# Patient Record
Sex: MALE | Race: WHITE | Employment: OTHER | URBAN - METROPOLITAN AREA
[De-identification: names, ages, dates, MRNs, and addresses within clinical notes are randomized per-mention and may not be internally consistent; named-entity substitution may affect disease eponyms.]

---

## 2019-03-07 PROBLEM — I10 ESSENTIAL HYPERTENSION: Status: ACTIVE | Noted: 2019-03-07

## 2020-03-11 PROBLEM — Z11.59 ENCOUNTER FOR HEPATITIS C SCREENING TEST FOR LOW RISK PATIENT: Status: ACTIVE | Noted: 2020-03-11

## 2020-03-11 PROBLEM — N40.0 BPH WITHOUT OBSTRUCTION/LOWER URINARY TRACT SYMPTOMS: Status: ACTIVE | Noted: 2020-03-11

## 2020-03-11 PROBLEM — E66.09 CLASS 1 OBESITY DUE TO EXCESS CALORIES WITH SERIOUS COMORBIDITY AND BODY MASS INDEX (BMI) OF 31.0 TO 31.9 IN ADULT: Status: ACTIVE | Noted: 2020-03-11

## 2020-03-11 PROBLEM — Z12.11 COLON CANCER SCREENING: Status: ACTIVE | Noted: 2020-03-11

## 2020-04-10 PROBLEM — Z12.11 COLON CANCER SCREENING: Status: RESOLVED | Noted: 2020-03-11 | Resolved: 2020-04-10

## 2020-09-18 PROBLEM — E78.00 HYPERCHOLESTEROLEMIA: Status: ACTIVE | Noted: 2020-09-18

## 2021-04-07 ENCOUNTER — CLINICAL SUPPORT (OUTPATIENT)
Dept: FAMILY MEDICINE CLINIC | Age: 66
End: 2021-04-07
Payer: COMMERCIAL

## 2021-04-07 VITALS — TEMPERATURE: 98.6 F

## 2021-04-07 DIAGNOSIS — Z23 ENCOUNTER FOR IMMUNIZATION: Primary | ICD-10-CM

## 2021-04-07 PROCEDURE — 90471 IMMUNIZATION ADMIN: CPT | Performed by: FAMILY MEDICINE

## 2021-04-07 PROCEDURE — 90715 TDAP VACCINE 7 YRS/> IM: CPT

## 2021-04-07 NOTE — PATIENT INSTRUCTIONS
Diphtheria/Acellular Pertussis/Tetanus Booster Vaccine (Tdap) (By injection) Pertussis Vaccine, Acellular (per-TUS-iss VAX-een, m-JIRC-nxb-lar), Reduced Diphtheria Toxoid (ree-DOOST dif-THEER-ee-a TOX-oyd), Tetanus Toxoid (TET-a-nus TOX-oyd) Protects against infections caused by tetanus (lockjaw), diphtheria, or pertussis (whooping cough). This is a booster vaccine. Brand Name(s): Adacel, Boostrix There may be other brand names for this medicine. When This Medicine Should Not Be Used: You should not receive this vaccine if you have had an allergic reaction to the separate or combined tetanus, diphtheria, or pertussis vaccine. You should not receive this vaccine if you have had seizures, mental changes, or any other serious reaction within 7 days after you received a pertussis vaccine. How to Use This Medicine:  
Injectable · A nurse or other health provider will give you this medicine. · Your doctor will prescribe your exact dose and tell you how often it should be given. This medicine is given as a shot into one of your muscles. · You may receive other vaccines at the same time as this one, but in a different body area. You should receive patient instructions for all of the vaccines. Talk to your doctor or nurse if you have questions. · Read and follow the patient instructions that come with this medicine. Talk to your doctor or pharmacist if you have any questions. Drugs and Foods to Avoid: Ask your doctor or pharmacist before using any other medicine, including over-the-counter medicines, vitamins, and herbal products. · Make sure the doctor knows if you are receiving a treatment or medicine that weakens your immune system. This includes radiation treatment, steroid medicines (such as dexamethasone, hydrocortisone, methylprednisolone, prednisolone, prednisone, Medrol®), or cancer medicines. Warnings While Using This Medicine: · Make sure your doctor knows if you are pregnant or breastfeeding or have epilepsy, a weak immune system, or a history of a stroke. Tell your doctor if you are sick or have a fever. · Tell your doctor about any reaction you had after you received a vaccine. This includes fainting, seizures, a fever over 105 degrees F, or severe redness or swelling where the shot was given. Tell your doctor if you have a history of Guillain-Barré syndrome after you received a vaccine with tetanus. · Call your doctor right away if you faint or have vision changes, numbness or tingling in your arms, hands, or feet, or a seizure after you receive this vaccine. · Tell your doctor if you have an allergy to latex. The syringes may contain dry natural latex rubber. · This vaccine will not treat an active infection. If you have a diphtheria, tetanus, or pertussis infection, you will need medicine to treat the infection. Possible Side Effects While Using This Medicine:  
Call your doctor right away if you notice any of these side effects: · Allergic reaction: Itching or hives, swelling in your face or hands, swelling or tingling in your mouth or throat, chest tightness, trouble breathing · Changes in vision · Fever over 105 degrees F 
· Lightheadedness or fainting · Numbness, tingling, or burning pain in your hands, arms, legs, or feet · Seizures · Sudden numbness or weakness in your arms or legs · Severe pain, redness, or swelling where the shot was given If you notice these less serious side effects, talk with your doctor:  
· Headache · Mild pain, redness, or swelling where the shot was given · Nausea, vomiting, diarrhea, or stomach pain · Tiredness If you notice other side effects that you think are caused by this medicine, tell your doctor. Call your doctor for medical advice about side effects. You may report side effects to FDA at 0-270-AHA-9821 © 2017 [facility] Information is for End User's use only and may not be sold, redistributed or otherwise used for commercial purposes. The above information is an  only. It is not intended as medical advice for individual conditions or treatments. Talk to your doctor, nurse or pharmacist before following any medical regimen to see if it is safe and effective for you.

## 2021-04-07 NOTE — PROGRESS NOTES
Tdap administered in left deltoid. Patient tolerated medication well. AVS provided to patient with medication information. Patient states understanding.

## 2021-06-17 ENCOUNTER — OFFICE VISIT (OUTPATIENT)
Dept: FAMILY MEDICINE CLINIC | Age: 66
End: 2021-06-17
Payer: COMMERCIAL

## 2021-06-17 VITALS
TEMPERATURE: 97.8 F | DIASTOLIC BLOOD PRESSURE: 82 MMHG | RESPIRATION RATE: 16 BRPM | SYSTOLIC BLOOD PRESSURE: 170 MMHG | HEART RATE: 69 BPM | HEIGHT: 69 IN | OXYGEN SATURATION: 96 % | WEIGHT: 210.4 LBS | BODY MASS INDEX: 31.16 KG/M2

## 2021-06-17 DIAGNOSIS — I10 ESSENTIAL HYPERTENSION: Primary | ICD-10-CM

## 2021-06-17 DIAGNOSIS — K57.90 DIVERTICULOSIS: ICD-10-CM

## 2021-06-17 DIAGNOSIS — E66.09 CLASS 1 OBESITY DUE TO EXCESS CALORIES WITH SERIOUS COMORBIDITY AND BODY MASS INDEX (BMI) OF 31.0 TO 31.9 IN ADULT: ICD-10-CM

## 2021-06-17 DIAGNOSIS — E78.00 HYPERCHOLESTEROLEMIA: ICD-10-CM

## 2021-06-17 DIAGNOSIS — K62.5 RECTAL BLEEDING: ICD-10-CM

## 2021-06-17 PROCEDURE — 99214 OFFICE O/P EST MOD 30 MIN: CPT | Performed by: FAMILY MEDICINE

## 2021-06-17 RX ORDER — LOSARTAN POTASSIUM 100 MG/1
TABLET ORAL
Qty: 30 TABLET | Refills: 5 | Status: SHIPPED | OUTPATIENT
Start: 2021-06-17 | End: 2022-02-21

## 2021-06-17 NOTE — ACP (ADVANCE CARE PLANNING)
Non-Provider Advance Care Planning (ACP) Note    Date of ACP Conversation: 6/17/2021  Persons included in Conversation: patient  Length of ACP Conversation in minutes: <16 minutes (Non-Billable)    Conversation requested by:   Provider    Authorized Decision Maker (if patient is incapable of making informed decisions): This person is:  Named in Advance Directive or Healthcare Power of       Primary Decision Maker: Justin Danielanadir [facility] - [phone]    General ACP for ALL Patients with Decision Making Capacity:    Advance Directive Conversation with Patients who have not yet planned:  N/A    Review of Existing Advance Directive: (Select questions covered)  Does this advance directive still reflect your preferences?   Yes    Interventions Provided:  N/A

## 2021-06-17 NOTE — PROGRESS NOTES
Iris Loya is a 61 y.o. male who presents with the following:  Chief Complaint   Patient presents with    Hypertension     6 mo follow up       Patient's hypertension has been doing well on his current medications without any chest pain shortness of breath nor edema however the patient had about 4 were stiff bourbons last night and he believes that that is probably what is caused his blood pressure to be up this morning. Additionally, the patient has been having couple episodes of rectal bleeding and has a history of having had surgery for diverticulitis and a stricture in his sigmoid colon. Patient does have BPH his last PSA was 1.7 and he would like to hold off for another year on doing this. No Known Allergies    Current Outpatient Medications   Medication Sig    losartan (COZAAR) 100 mg tablet TAKE 1 TABLET BY MOUTH EVERY DAY    aspirin (ASPIRIN) 325 mg tablet Take 325 mg by mouth daily. No current facility-administered medications for this visit. Past Medical History:   Diagnosis Date    Hypertension        Past Surgical History:   Procedure Laterality Date    [de-identified]      [place] ABDOMEN SURGERY PROC UNLISTED  2009    part of intestines removed       Family History   Problem Relation Age of Onset    Cancer Father        Social History     Socioeconomic History    Marital status:      Spouse name: Not on file    Number of children: Not on file    Years of education: Not on file    Highest education level: Not on file   Tobacco Use    Smoking status: Never Smoker    Smokeless tobacco: Never Used   Substance and Sexual Activity    Alcohol use:  Yes     Alcohol/week: 20.0 standard drinks     Types: 20 Cans of beer per week     Social Determinants of Health     Financial Resource Strain:     Difficulty of Paying Living Expenses:    Food Insecurity:     Worried About Running Out of Food in the Last Year:     [address] in the Last Year:    Transportation Needs:  Lack of Transportation (Medical):  Lack of Transportation (Non-Medical):    Physical Activity:     Days of Exercise per Week:     Minutes of Exercise per Session:    Stress:     Feeling of Stress :    Social Connections:     Frequency of Communication with Friends and Family:     Frequency of Social Gatherings with Friends and Family:     Attends Orthodox Services:     Active Member of Clubs or Organizations:     Attends Club or Organization Meetings:     Marital Status:        Review of Systems   Constitutional: Negative for chills, fever, malaise/fatigue and weight loss. HENT: Negative for congestion, hearing loss, sore throat and tinnitus. Eyes: Negative for blurred vision, pain and discharge. Respiratory: Negative for cough, shortness of breath and wheezing. Cardiovascular: Negative for chest pain, palpitations, orthopnea, claudication and leg swelling. Gastrointestinal: Positive for blood in stool. Negative for abdominal pain, constipation and heartburn. Genitourinary: Negative for dysuria, frequency and urgency. Musculoskeletal: Negative for falls, joint pain and myalgias. Skin: Negative for itching and rash. Neurological: Negative for dizziness, tingling, tremors and headaches. Endo/Heme/Allergies: Negative for environmental allergies and polydipsia. Psychiatric/Behavioral: Negative for depression and substance abuse. The patient is not nervous/anxious. Visit Vitals  BP (!) 170/82 (BP 1 Location: Left upper arm, BP Patient Position: Sitting, BP Cuff Size: Large adult)   Pulse 69   Temp 97.8 °F (36.6 °C) (Oral)   Resp 16   Ht 5' 9\" (1.753 m)   Wt 210 lb 6.4 oz (95.4 kg)   SpO2 96%   BMI 31.07 kg/m²     Physical Exam  Vitals reviewed. Constitutional:       General: He is not in acute distress. Appearance: Normal appearance. He is not ill-appearing. HENT:      Head: Normocephalic and atraumatic.       Right Ear: Tympanic membrane, ear canal and external ear normal.      Left Ear: Tympanic membrane, ear canal and external ear normal.      Nose: Nose normal. No congestion or rhinorrhea. Mouth/Throat:      Mouth: Mucous membranes are moist.      Pharynx: No oropharyngeal exudate or posterior oropharyngeal erythema. Eyes:      Extraocular Movements: Extraocular movements intact. Conjunctiva/sclera: Conjunctivae normal.      Pupils: Pupils are equal, round, and reactive to light. Comments: Pupil iris and anterior chamber normal.   Neck:      Trachea: No tracheal deviation. Cardiovascular:      Rate and Rhythm: Normal rate and regular rhythm. Pulses: Normal pulses. Heart sounds: Normal heart sounds. No murmur heard. No friction rub. No gallop. Pulmonary:      Effort: Pulmonary effort is normal. No respiratory distress. Breath sounds: Normal breath sounds. No wheezing, rhonchi or rales. Chest:      Chest wall: No tenderness. Abdominal:      General: Bowel sounds are normal. There is no distension. Palpations: Abdomen is soft. There is no mass. Tenderness: There is no abdominal tenderness. There is no guarding or rebound. Hernia: No hernia is present. Musculoskeletal:         General: No tenderness. Normal range of motion. Cervical back: Normal range of motion and neck supple. Right lower leg: No edema. Left lower leg: No edema. Lymphadenopathy:      Cervical: No cervical adenopathy. Skin:     General: Skin is warm and dry. Findings: No erythema or rash. Neurological:      General: No focal deficit present. Mental Status: He is alert and oriented to person, place, and time. Cranial Nerves: No cranial nerve deficit. Motor: No abnormal muscle tone. Deep Tendon Reflexes: Reflexes are normal and symmetric. Reflexes normal.      Comments: Cranial nerves II through XII intact sensory and motor.   Deep tendon reflexes in the biceps triceps knee and ankle are normal and bilaterally symmetrical.   Psychiatric:         Mood and Affect: Mood normal.         Behavior: Behavior normal.         Thought Content: Thought content normal.         Judgment: Judgment normal.           ICD-10-CM ICD-9-CM    1. Essential hypertension  I10 401.9 losartan (COZAAR) 100 mg tablet      METABOLIC PANEL, COMPREHENSIVE   2. Hypercholesterolemia  E78.00 272.0 LIPID PANEL   3. Class 1 obesity due to excess calories with serious comorbidity and body mass index (BMI) of 31.0 to 31.9 in adult  E66.09 278.00     Z68.31 V85.31    4.  Rectal bleeding  K62.5 569.3 CBC WITH AUTOMATED DIFF      FERRITIN      REFERRAL TO SURGERY   5. Diverticulosis  K57.90 562.10 REFERRAL TO SURGERY       Orders Placed This Encounter    METABOLIC PANEL, COMPREHENSIVE     Standing Status:   Future     Standing Expiration Date:   6/17/2022    LIPID PANEL     Standing Status:   Future     Standing Expiration Date:   6/17/2022    CBC WITH AUTOMATED DIFF     Standing Status:   Future     Standing Expiration Date:   6/17/2022    FERRITIN     Standing Status:   Future     Standing Expiration Date:   6/17/2022    REFERRAL TO SURGERY     Referral Priority:   Routine     Referral Type:   Consultation     Referral Reason:   Specialty Services Required     Referred to Provider:   Allyson Guevara MD     Number of Visits Requested:   1    losartan (COZAAR) 100 mg tablet     Sig: TAKE 1 TABLET BY MOUTH EVERY DAY     Dispense:  30 Tablet     Refill:  5           Edgard Wilson MD

## 2021-06-19 LAB
ALBUMIN SERPL-MCNC: 4.3 G/DL (ref 3.8–4.8)
ALBUMIN/GLOB SERPL: 1.9 {RATIO} (ref 1.2–2.2)
ALP SERPL-CCNC: 42 IU/L (ref 48–121)
ALT SERPL-CCNC: 21 IU/L (ref 0–44)
AST SERPL-CCNC: 22 IU/L (ref 0–40)
BASOPHILS # BLD AUTO: 0 X10E3/UL (ref 0–0.2)
BASOPHILS NFR BLD AUTO: 0 %
BILIRUB SERPL-MCNC: 0.6 MG/DL (ref 0–1.2)
BUN SERPL-MCNC: 11 MG/DL (ref 8–27)
BUN/CREAT SERPL: 12 (ref 10–24)
CALCIUM SERPL-MCNC: 9.7 MG/DL (ref 8.6–10.2)
CHLORIDE SERPL-SCNC: 111 MMOL/L (ref 96–106)
CHOLEST SERPL-MCNC: 199 MG/DL (ref 100–199)
CO2 SERPL-SCNC: 22 MMOL/L (ref 20–29)
CREAT SERPL-MCNC: 0.91 MG/DL (ref 0.76–1.27)
EOSINOPHIL # BLD AUTO: 0.1 X10E3/UL (ref 0–0.4)
EOSINOPHIL NFR BLD AUTO: 3 %
ERYTHROCYTE [DISTWIDTH] IN BLOOD BY AUTOMATED COUNT: 12.6 % (ref 11.6–15.4)
FERRITIN SERPL-MCNC: 450 NG/ML (ref 30–400)
GLOBULIN SER CALC-MCNC: 2.3 G/DL (ref 1.5–4.5)
GLUCOSE SERPL-MCNC: 93 MG/DL (ref 65–99)
HCT VFR BLD AUTO: 41.6 % (ref 37.5–51)
HDLC SERPL-MCNC: 75 MG/DL
HGB BLD-MCNC: 14.3 G/DL (ref 13–17.7)
IMM GRANULOCYTES # BLD AUTO: 0 X10E3/UL (ref 0–0.1)
IMM GRANULOCYTES NFR BLD AUTO: 1 %
IMP & REVIEW OF LAB RESULTS: NORMAL
LDLC SERPL CALC-MCNC: 101 MG/DL (ref 0–99)
LYMPHOCYTES # BLD AUTO: 1.6 X10E3/UL (ref 0.7–3.1)
LYMPHOCYTES NFR BLD AUTO: 39 %
MCH RBC QN AUTO: 32.5 PG (ref 26.6–33)
MCHC RBC AUTO-ENTMCNC: 34.4 G/DL (ref 31.5–35.7)
MCV RBC AUTO: 95 FL (ref 79–97)
MONOCYTES # BLD AUTO: 0.3 X10E3/UL (ref 0.1–0.9)
MONOCYTES NFR BLD AUTO: 6 %
NEUTROPHILS # BLD AUTO: 2.1 X10E3/UL (ref 1.4–7)
NEUTROPHILS NFR BLD AUTO: 51 %
PLATELET # BLD AUTO: 169 X10E3/UL (ref 150–450)
POTASSIUM SERPL-SCNC: 4.8 MMOL/L (ref 3.5–5.2)
PROT SERPL-MCNC: 6.6 G/DL (ref 6–8.5)
RBC # BLD AUTO: 4.4 X10E6/UL (ref 4.14–5.8)
SODIUM SERPL-SCNC: 144 MMOL/L (ref 134–144)
TRIGL SERPL-MCNC: 135 MG/DL (ref 0–149)
VLDLC SERPL CALC-MCNC: 23 MG/DL (ref 5–40)
WBC # BLD AUTO: 4.1 X10E3/UL (ref 3.4–10.8)

## 2021-07-14 ENCOUNTER — OFFICE VISIT (OUTPATIENT)
Dept: SURGERY | Age: 66
End: 2021-07-14
Payer: COMMERCIAL

## 2021-07-14 VITALS
DIASTOLIC BLOOD PRESSURE: 85 MMHG | RESPIRATION RATE: 18 BRPM | OXYGEN SATURATION: 95 % | HEIGHT: 69 IN | WEIGHT: 211.4 LBS | HEART RATE: 77 BPM | SYSTOLIC BLOOD PRESSURE: 153 MMHG | BODY MASS INDEX: 31.31 KG/M2

## 2021-07-14 DIAGNOSIS — K62.5 RECTAL BLEEDING: Primary | ICD-10-CM

## 2021-07-14 PROCEDURE — 99203 OFFICE O/P NEW LOW 30 MIN: CPT | Performed by: SURGERY

## 2021-07-14 NOTE — PROGRESS NOTES
1. Have you been to the ER, urgent care clinic since your last visit? Hospitalized since your last visit? new pt    2. Have you seen or consulted any other health care providers outside of the [address] [place] [address] since your last visit? Include any pap smears or colon screening.  new pt

## 2021-07-14 NOTE — PROGRESS NOTES
Payal Pugh is a 61 y.o. male who presents today with the following:  Chief Complaint   Patient presents with    Rectal Bleeding       HPI    82-year-old male who presents to us as a referral by Dr. Nima Light for rectal bleeding. He apparently has had 2 episodes in the last year where he has had bright red blood per rectum that is changed the color the water in the commode. He states that he knows that he has hemorrhoids but this is different. Typically when he has hemorrhoidal bleeding he will notice a few drops of blood when he wipes. In 2005 he had a colonoscopy in the identified a stricture. Apparently did a barium enema and stated that it was a relatively long segment. He had a resection of what sounds like the sigmoid colon although he is unsure. In 2012 he had a repeat colonoscopy that he believes was normal. In 2018 he had another colonoscopy where he believes they may or may not of told that he has polyps but he does not recall them telling them they removed polyps. He presents now because of the new rectal bleeding. He denies any significant abdominal pain although he has had some chronic abdominal pain near his incision in the left lower quadrant. He denies any diarrhea or constipation on a regular basis although he does state that there is some fluctuation with his bowels. He denies any change in the caliber of his stool or any unexpected weight loss. He does not use tobacco. He does drink at least 2-3 beers a day in addition to 2-3 mixed drinks 3 or 4 times a week. He does have a diagnosis of hypertension. He states he has had his Covid vaccinations. He does take an 81 mg aspirin a day.       Past Medical History:   Diagnosis Date    Hypertension        Past Surgical History:   Procedure Laterality Date    [de-identified]      [place] ABDOMEN SURGERY PROC UNLISTED  2009    part of intestines removed       Social History     Socioeconomic History    Marital status:      Spouse name: Not on file    Number of children: Not on file    Years of education: Not on file    Highest education level: Not on file   Occupational History    Not on file   Tobacco Use    Smoking status: Never Smoker    Smokeless tobacco: Never Used   Substance and Sexual Activity    Alcohol use: Yes     Alcohol/week: 20.0 standard drinks     Types: 20 Cans of beer per week    Drug use: Not on file    Sexual activity: Not on file   Other Topics Concern    Not on file   Social History Narrative    Not on file     Social Determinants of Health     Financial Resource Strain:     Difficulty of Paying Living Expenses:    Food Insecurity:     Worried About Running Out of Food in the Last Year:     [address] in the Last Year:    Transportation Needs:     Lack of Transportation (Medical):  Lack of Transportation (Non-Medical):    Physical Activity:     Days of Exercise per Week:     Minutes of Exercise per Session:    Stress:     Feeling of Stress :    Social Connections:     Frequency of Communication with Friends and Family:     Frequency of Social Gatherings with Friends and Family:     Attends Scientologist Services:     Active Member of Clubs or Organizations:     Attends Club or Organization Meetings:     Marital Status:    Intimate Partner Violence:     Fear of Current or Ex-Partner:     Emotionally Abused:     Physically Abused:     Sexually Abused:        Family History   Problem Relation Age of Onset    Cancer Father        No Known Allergies    Current Outpatient Medications   Medication Sig    losartan (COZAAR) 100 mg tablet TAKE 1 TABLET BY MOUTH EVERY DAY    aspirin (ASPIRIN) 325 mg tablet Take 325 mg by mouth daily. No current facility-administered medications for this visit. The above histories, medications and allergies have been reviewed.     ROS    Visit Vitals  BP (!) 153/85 (BP 1 Location: Left upper arm, BP Patient Position: Sitting)   Pulse 77   Resp 18   Ht 5' 9\" (1.753 m) Wt 211 lb 6.4 oz (95.9 kg)   SpO2 95%   BMI 31.22 kg/m²     Physical Exam  Constitutional:       Appearance: Normal appearance. Cardiovascular:      Heart sounds: Normal heart sounds. Pulmonary:      Effort: Pulmonary effort is normal.      Breath sounds: Normal breath sounds. Abdominal:      General: There is no distension. Palpations: Abdomen is soft. There is no mass. Tenderness: There is no abdominal tenderness. Comments: Short supraumbilical incision well-healed as well as left lower quadrant incision. Neurological:      Mental Status: He is alert. 1. Rectal bleeding  Recommend colonoscopy. The procedure was explained in detail including the risks and benefits. Risks shared included risks of missed lesions, incomplete exam, colon injury or perforation. Risks associated with anesthesia were also discussed. The patient wishes to proceed and we will schedule. Most likely this represents hemorrhoidal bleeding although the amount is different for him and there is a question of if there were polyps in his 2018 colonoscopy. The patient was counseled at length about the risks of jina Covid-19 during their perioperative period and any recovery window from their procedure. The patient was made aware that jina Covid-19  may worsen their prognosis for recovering from their procedure and lend to a higher morbidity and/or mortality risk. All material risks, benefits, and reasonable alternatives including postponing the procedure were discussed. The patient does  wish to proceed with the procedure at this time. Follow-up and Dispositions    · Return for post procedure.          Esperanza Sawyer MD

## 2021-07-14 NOTE — LETTER
7/14/2021    Patient: Mariela Lamar   YOB: 1955   Date of Visit: 7/14/2021     Viola Merrill MD  [address] [place] [id number]  [facility] [facility] [address]    Dear Viola Merrill MD,      Thank you for referring Mr. Olman Fox to [address] [place] [address] for evaluation. My notes for this consultation are attached. If you have questions, please do not hesitate to call me. I look forward to following your patient along with you.       Sincerely,    Jeff Diaz MD

## 2021-09-14 ENCOUNTER — ANESTHESIA EVENT (OUTPATIENT)
Dept: SURGERY | Age: 66
End: 2021-09-14
Payer: COMMERCIAL

## 2021-09-14 NOTE — ANESTHESIA PREPROCEDURE EVALUATION
Relevant Problems   CARDIOVASCULAR   (+) Essential hypertension      ENDOCRINE   (+) Class 1 obesity due to excess calories with serious comorbidity and body mass index (BMI) of 31.0 to 31.9 in adult       Anesthetic History               Review of Systems / Medical History  Patient summary reviewed, nursing notes reviewed and pertinent labs reviewed    Pulmonary  Within defined limits                 Neuro/Psych   Within defined limits           Cardiovascular    Hypertension          Hyperlipidemia         GI/Hepatic/Renal       Hepatitis: type C    Liver disease    Comments: BPH Endo/Other        Obesity (BMI 31)     Other Findings            Physical Exam    Airway  Mallampati: II  TM Distance: > 6 cm  Neck ROM: normal range of motion   Mouth opening: Normal     Cardiovascular    Rhythm: regular  Rate: normal         Dental  No notable dental hx       Pulmonary  Breath sounds clear to auscultation               Abdominal  GI exam deferred       Other Findings            Anesthetic Plan    ASA: 2  Anesthesia type: MAC          Induction: Intravenous  Anesthetic plan and risks discussed with: Patient

## 2021-09-22 NOTE — PERIOP NOTES
[address] [place] [address]  SURGICAL PRE-ADMISSION INSTRUCTIONS    ARRIVAL  · You will be called the day before your surgery with your expected arrival time. · Sign in at the  of the hospital.  You will be directed to the Surgical Waiting Room. · Please arrive at your scheduled appointment time. You have been scheduled to arrive for your procedure one or two hours prior to the expected start time of your procedure. · Every effort will be made to minimize your wait but please be aware that unforeseen circumstances may affect our schedule. EATING  · DO NOT EAT OR DRINK ANYTHING AFTER MIDNIGHT ON THE EVENING BEFORE YOUR SURGERY OR ON THE DAY OF YOUR SURGERY except for your medications (as instructed) with a sip of water. · Do not use gum, mints or lozenges on the morning of your surgery. · Please do not smoke or chew tobacco before your surgery. MEDICATIONS   · none    STOP THESE MEDICATIONS AT THE TIMES LISTED BELOW  aspirin     DRIVING/TRANSPORATION  · Have a responsible adult to drive you home from the hospital and to stay with you over night. Please have them plan to remain in the hospital during your surgery. Your surgery will not be done if you do not have a responsible adult to take you home and to stay with you. · If you have arranged for public transport, you must have a responsible adult to ride with you (who is not the ). · You may not drive for 24 hours after anesthesia. PREPARATION  · If you have a Living WiIl/Advance Directive, please bring a copy with you to scan into your chart. · Please DO NOT wear makeup or nail polish  · Please leave valuables at home,  DO NOT wear jewelry. · Wear loose, comfortable clothing that is large enough to cover a bulky dressing.           Patient:  Karen Krystina   Date:     September 22, 2021  Time:   10:34 AM    RN:  Cynthia Zavaleta RN    Date:     September 22, 2021  Time:   10:34 AM

## 2021-09-24 ENCOUNTER — HOSPITAL ENCOUNTER (OUTPATIENT)
Dept: PREADMISSION TESTING | Age: 66
Discharge: HOME | End: 2021-09-24
Payer: COMMERCIAL

## 2021-09-24 DIAGNOSIS — U07.1 COVID-19: ICD-10-CM

## 2021-09-24 PROCEDURE — U0005 INFEC AGEN DETEC AMPLI PROBE: HCPCS

## 2021-09-26 LAB
SARS-COV-2, XPLCVT: NOT DETECTED
SOURCE, COVRS: NORMAL

## 2021-09-27 NOTE — H&P
History and Physical    HPI    30-year-old male who presents to us as a referral by Dr. Matthew Wheeler for rectal bleeding. He apparently has had 2 episodes in the last year where he has had bright red blood per rectum that is changed the color the water in the commode. He states that he knows that he has hemorrhoids but this is different. Typically when he has hemorrhoidal bleeding he will notice a few drops of blood when he wipes. In 2005 he had a colonoscopy in which they identified a stricture. Apparently did a barium enema and stated that it was a relatively long segment. He had a resection of what sounds like the sigmoid colon although he is unsure. In 2012 he had a repeat colonoscopy that he believes was normal. In 2018 he had another colonoscopy where he believes they may or may not of told that he has polyps but he does not recall them telling them they removed polyps. He presents now because of the new rectal bleeding. He denies any significant abdominal pain although he has had some chronic abdominal pain near his incision in the left lower quadrant. He denies any diarrhea or constipation on a regular basis although he does state that there is some fluctuation with his bowels. He denies any change in the caliber of his stool or any unexpected weight loss. He does not use tobacco. He does drink at least 2-3 beers a day in addition to 2-3 mixed drinks 3 or 4 times a week. He does have a diagnosis of hypertension. He states he has had his Covid vaccinations. He does take an 81 mg aspirin a day.       Past Medical History:   Diagnosis Date    Hypertension        Past Surgical History:   Procedure Laterality Date    [de-identified]      VT ABDOMEN SURGERY PROC UNLISTED  2009    part of intestines removed       Social History     Socioeconomic History    Marital status:      Spouse name: Not on file    Number of children: Not on file    Years of education: Not on file    Highest education level: Not on file   Occupational History    Not on file   Tobacco Use    Smoking status: Never Smoker    Smokeless tobacco: Never Used   Substance and Sexual Activity    Alcohol use: Yes     Alcohol/week: 20.0 standard drinks     Types: 20 Cans of beer per week    Drug use: Not on file    Sexual activity: Not on file   Other Topics Concern    Not on file   Social History Narrative    Not on file     Social Determinants of Health     Financial Resource Strain:     Difficulty of Paying Living Expenses:    Food Insecurity:     Worried About Running Out of Food in the Last Year:     [address] in the Last Year:    Transportation Needs:     Lack of Transportation (Medical):  Lack of Transportation (Non-Medical):    Physical Activity:     Days of Exercise per Week:     Minutes of Exercise per Session:    Stress:     Feeling of Stress :    Social Connections:     Frequency of Communication with Friends and Family:     Frequency of Social Gatherings with Friends and Family:     Attends [employer] Services:     Active Member of Clubs or Organizations:     Attends Club or Organization Meetings:     Marital Status:    Intimate Partner Violence:     Fear of Current or Ex-Partner:     Emotionally Abused:     Physically Abused:     Sexually Abused:        Family History   Problem Relation Age of Onset    Cancer Father        No Known Allergies    No current facility-administered medications for this encounter. Current Outpatient Medications   Medication Sig    losartan (COZAAR) 100 mg tablet TAKE 1 TABLET BY MOUTH EVERY DAY    aspirin (ASPIRIN) 325 mg tablet Take 81 mg by mouth daily. The above histories, medications and allergies have been reviewed. ROS    There were no vitals taken for this visit. Physical Exam  Constitutional:       Appearance: Normal appearance. Cardiovascular:      Heart sounds: Normal heart sounds.    Pulmonary:      Effort: Pulmonary effort is normal.      Breath sounds: Normal breath sounds. Abdominal:      General: There is no distension. Palpations: Abdomen is soft. There is no mass. Tenderness: There is no abdominal tenderness. Comments: Short supraumbilical incision well-healed as well as left lower quadrant incision. Neurological:      Mental Status: He is alert. 1. Rectal bleeding  Recommend colonoscopy. The procedure was explained in detail including the risks and benefits. Risks shared included risks of missed lesions, incomplete exam, colon injury or perforation. Risks associated with anesthesia were also discussed. The patient wishes to proceed and we will schedule. Most likely this represents hemorrhoidal bleeding although the amount is different for him and there is a question of if there were polyps in his 2018 colonoscopy. The patient was counseled at length about the risks of jina Covid-19 during their perioperative period and any recovery window from their procedure. The patient was made aware that jina Covid-19  may worsen their prognosis for recovering from their procedure and lend to a higher morbidity and/or mortality risk. All material risks, benefits, and reasonable alternatives including postponing the procedure were discussed. The patient does  wish to proceed with the procedure at this time.               Andrea Denney MD

## 2021-09-28 ENCOUNTER — ANESTHESIA (OUTPATIENT)
Dept: SURGERY | Age: 66
End: 2021-09-28
Payer: COMMERCIAL

## 2021-09-28 ENCOUNTER — HOSPITAL ENCOUNTER (OUTPATIENT)
Age: 66
Setting detail: OUTPATIENT SURGERY
Discharge: HOME | End: 2021-09-28
Attending: SURGERY | Admitting: SURGERY
Payer: COMMERCIAL

## 2021-09-28 VITALS
TEMPERATURE: 97.5 F | RESPIRATION RATE: 13 BRPM | DIASTOLIC BLOOD PRESSURE: 75 MMHG | WEIGHT: 211 LBS | OXYGEN SATURATION: 94 % | HEART RATE: 62 BPM | HEIGHT: 69 IN | BODY MASS INDEX: 31.25 KG/M2 | SYSTOLIC BLOOD PRESSURE: 140 MMHG

## 2021-09-28 DIAGNOSIS — K62.5 RECTAL BLEEDING: ICD-10-CM

## 2021-09-28 PROCEDURE — 88305 TISSUE EXAM BY PATHOLOGIST: CPT

## 2021-09-28 PROCEDURE — 76210000063 HC OR PH I REC FIRST 0.5 HR: Performed by: SURGERY

## 2021-09-28 PROCEDURE — 77030037006 HC FCPS BIOP ENDOSC DISP OCOA -A: Performed by: SURGERY

## 2021-09-28 PROCEDURE — 45385 COLONOSCOPY W/LESION REMOVAL: CPT | Performed by: SURGERY

## 2021-09-28 PROCEDURE — 76060000032 HC ANESTHESIA 0.5 TO 1 HR: Performed by: SURGERY

## 2021-09-28 PROCEDURE — 74011250636 HC RX REV CODE- 250/636: Performed by: ANESTHESIOLOGY

## 2021-09-28 PROCEDURE — 2709999900 HC NON-CHARGEABLE SUPPLY: Performed by: SURGERY

## 2021-09-28 PROCEDURE — 45380 COLONOSCOPY AND BIOPSY: CPT | Performed by: SURGERY

## 2021-09-28 PROCEDURE — 77030013992 HC SNR POLYP ENDOSC BSC -B: Performed by: SURGERY

## 2021-09-28 PROCEDURE — 76010000138 HC OR TIME 0.5 TO 1 HR: Performed by: SURGERY

## 2021-09-28 PROCEDURE — 74011250636 HC RX REV CODE- 250/636: Performed by: SURGERY

## 2021-09-28 RX ORDER — SODIUM CHLORIDE, SODIUM LACTATE, POTASSIUM CHLORIDE, CALCIUM CHLORIDE 600; 310; 30; 20 MG/100ML; MG/100ML; MG/100ML; MG/100ML
125 INJECTION, SOLUTION INTRAVENOUS CONTINUOUS
Status: DISCONTINUED | OUTPATIENT
Start: 2021-09-28 | End: 2021-09-28 | Stop reason: HOSPADM

## 2021-09-28 RX ORDER — SODIUM CHLORIDE 0.9 % (FLUSH) 0.9 %
5-40 SYRINGE (ML) INJECTION EVERY 8 HOURS
Status: DISCONTINUED | OUTPATIENT
Start: 2021-09-28 | End: 2021-09-28 | Stop reason: HOSPADM

## 2021-09-28 RX ORDER — PROPOFOL 10 MG/ML
INJECTION, EMULSION INTRAVENOUS AS NEEDED
Status: DISCONTINUED | OUTPATIENT
Start: 2021-09-28 | End: 2021-09-28 | Stop reason: HOSPADM

## 2021-09-28 RX ORDER — MIDAZOLAM HYDROCHLORIDE 1 MG/ML
INJECTION, SOLUTION INTRAMUSCULAR; INTRAVENOUS AS NEEDED
Status: DISCONTINUED | OUTPATIENT
Start: 2021-09-28 | End: 2021-09-28 | Stop reason: HOSPADM

## 2021-09-28 RX ORDER — SODIUM CHLORIDE 0.9 % (FLUSH) 0.9 %
5-40 SYRINGE (ML) INJECTION AS NEEDED
Status: DISCONTINUED | OUTPATIENT
Start: 2021-09-28 | End: 2021-09-28 | Stop reason: HOSPADM

## 2021-09-28 RX ADMIN — PROPOFOL 30 MG: 10 INJECTION, EMULSION INTRAVENOUS at 13:15

## 2021-09-28 RX ADMIN — PROPOFOL 30 MG: 10 INJECTION, EMULSION INTRAVENOUS at 13:19

## 2021-09-28 RX ADMIN — SODIUM CHLORIDE, POTASSIUM CHLORIDE, SODIUM LACTATE AND CALCIUM CHLORIDE 125 ML/HR: 600; 310; 30; 20 INJECTION, SOLUTION INTRAVENOUS at 13:00

## 2021-09-28 RX ADMIN — MIDAZOLAM 4 MG: 1 INJECTION INTRAMUSCULAR; INTRAVENOUS at 12:58

## 2021-09-28 RX ADMIN — PROPOFOL 30 MG: 10 INJECTION, EMULSION INTRAVENOUS at 13:10

## 2021-09-28 RX ADMIN — PROPOFOL 30 MG: 10 INJECTION, EMULSION INTRAVENOUS at 13:05

## 2021-09-28 RX ADMIN — PROPOFOL 50 MG: 10 INJECTION, EMULSION INTRAVENOUS at 12:58

## 2021-09-28 RX ADMIN — PROPOFOL 30 MG: 10 INJECTION, EMULSION INTRAVENOUS at 13:27

## 2021-09-28 RX ADMIN — PROPOFOL 50 MG: 10 INJECTION, EMULSION INTRAVENOUS at 13:00

## 2021-09-28 NOTE — DISCHARGE INSTRUCTIONS
Patient Education        Colonoscopy: What to Expect at Home  Your Recovery  After a colonoscopy, you'll stay at the clinic until you wake up. Then you can go home. But you'll need to arrange for a ride. Your doctor will tell you when you can eat and do your other usual activities. Your doctor will talk to you about when you'll need your next colonoscopy. Your doctor can help you decide how often you need to be checked. This will depend on the results of your test and your risk for colorectal cancer. After the test, you may be bloated or have gas pains. You may need to pass gas. If a biopsy was done or a polyp was removed, you may have streaks of blood in your stool (feces) for a few days. Problems such as heavy rectal bleeding may not occur until several weeks after the test. This isn't common. But it can happen after polyps are removed. This care sheet gives you a general idea about how long it will take for you to recover. But each person recovers at a different pace. Follow the steps below to get better as quickly as possible. How can you care for yourself at home? Activity    · Rest when you feel tired.     · You can do your normal activities when it feels okay to do so. Diet    · Follow your doctor's directions for eating.     · Unless your doctor has told you not to, drink plenty of fluids. This helps to replace the fluids that were lost during the colon prep.     · Do not drink alcohol. Medicines    · Your doctor will tell you if and when you can restart your medicines. You will also be given instructions about taking any new medicines.     · If you take aspirin or some other blood thinner, ask your doctor if and when to start taking it again. Make sure that you understand exactly what your doctor wants you to do.     · If polyps were removed or a biopsy was done during the test, your doctor may tell you not to take aspirin or other anti-inflammatory medicines for a few days.  These include ibuprofen (Advil, Motrin) and naproxen (Aleve). Other instructions    · For your safety, do not drive or operate machinery until the medicine wears off and you can think clearly. Your doctor may tell you not to drive or operate machinery until the day after your test.     · Do not sign legal documents or make major decisions until the medicine wears off and you can think clearly. The anesthesia can make it hard for you to fully understand what you are agreeing to. Follow-up care is a key part of your treatment and safety. Be sure to make and go to all appointments, and call your doctor if you are having problems. It's also a good idea to know your test results and keep a list of the medicines you take. When should you call for help? Call 911 anytime you think you may need emergency care. For example, call if:    · You passed out (lost consciousness).     · You pass maroon or bloody stools.     · You have trouble breathing. Call your doctor now or seek immediate medical care if:    · You have pain that does not get better after you take pain medicine.     · You are sick to your stomach or cannot drink fluids.     · You have new or worse belly pain.     · You have blood in your stools.     · You have a fever.     · You cannot pass stools or gas. Watch closely for changes in your health, and be sure to contact your doctor if you have any problems. Where can you learn more? Go to http://www.gray.com/  Enter E264 in the search box to learn more about \"Colonoscopy: What to Expect at Home. \"  Current as of: December 17, 2020               Content Version: 13.0  © 3085-2428 [employer]. Care instructions adapted under license by [employer] (which disclaims liability or warranty for this information).  If you have questions about a medical condition or this instruction, always ask your healthcare professional. Hunter [employer]rez disclaims any warranty or liability for your use of this information.

## 2021-09-28 NOTE — ANESTHESIA POSTPROCEDURE EVALUATION
Procedure(s):  COLONOSCOPY. MAC    Anesthesia Post Evaluation      Multimodal analgesia: multimodal analgesia not used between 6 hours prior to anesthesia start to PACU discharge  Patient location during evaluation: bedside  Patient participation: complete - patient participated  Level of consciousness: awake and alert  Pain score: 0  Pain management: adequate  Airway patency: patent  Anesthetic complications: no  Cardiovascular status: acceptable  Respiratory status: acceptable  Hydration status: acceptable  Post anesthesia nausea and vomiting:  none  Final Post Anesthesia Temperature Assessment:  Normothermia (36.0-37.5 degrees C)      INITIAL Post-op Vital signs:   Vitals Value Taken Time   /75 09/28/21 1350   Temp 36.4 °C (97.5 °F) 09/28/21 1332   Pulse 56 09/28/21 1351   Resp 19 09/28/21 1351   SpO2 95 % 09/28/21 1351   Vitals shown include unvalidated device data.

## 2021-09-28 NOTE — INTERVAL H&P NOTE
Update History & Physical    The Patient's History and Physical of September 27, 2021 was reviewed with the patient and I examined the patient. There was no change. The surgical site was confirmed by the patient and me. Plan:  The risk, benefits, expected outcome, and alternative to the recommended procedure have been discussed with the patient. Patient understands and wants to proceed with the procedure.     Electronically signed by Ness Corona MD on 9/28/2021 at 12:50 PM

## 2021-09-28 NOTE — BRIEF OP NOTE
Brief Postoperative Note    Patient: Azra Singh  YOB: 1955  MRN: [id number]    Date of Procedure: 9/28/2021     Pre-Op Diagnosis: RECTAL BLEEDING    Post-Op Diagnosis: Same as preoperative diagnosis. Procedure(s):  COLONOSCOPY with hot snare polypectomy and cold biopsy polypectomies     Surgeon(s):  Kandice Brewer MD    Surgical Assistant: None    Anesthesia: MAC     Estimated Blood Loss (mL): Minimal    Complications: None    Specimens:   ID Type Source Tests Collected by Time Destination   1 : cecal poylp Preservative Cecum  Kandice Brewer MD 9/28/2021 1306 Pathology   2 : proximal ascending colon polyp Preservative Colon, Ascending  Kandice Brewer MD 9/28/2021 1319 Pathology   3 : mid ascending colon polyp Preservative Colon, Ascending  Kandice Brewer MD 9/28/2021 1321 Pathology   4 : polyp @ 60cm Preservative Colon  Kandice Brewer MD 9/28/2021 1324 Pathology        Implants: * No implants in log *    Drains: * No LDAs found *    Findings: 1. Multiple colon polyps  2. Internal and external hemorrhoids  3.  Diverticulosis     Electronically Signed by Anny Giraldo MD on 9/28/2021 at 1:31 PM

## 2021-09-29 NOTE — OP NOTES
[facility]  OPERATIVE REPORT    Name:  Sarita Lincoln  MR#:  [id number]  :  1955  ACCOUNT #:  [de-identified]  DATE OF SERVICE:  2021    PREOPERATIVE DIAGNOSIS:  Rectal bleeding. POSTOPERATIVE DIAGNOSIS:  Rectal bleeding. PROCEDURE PERFORMED:  1. Colonoscopy with hot snare polypectomy. 2.  Cold biopsy polypectomy. SURGEON:  Char Vallejo MD    ASSISTANT:  None    ANESTHESIA:  MAC.    COMPLICATIONS:  None. SPECIMENS REMOVED:  1. Cecal polyp removed by cold biopsy forceps. 2.  Proximal ascending colon polyp removed by hot snare technique. 3.  Mid ascending colon polyp removed by cold biopsy forceps. 4.  Polyp at 60 cm removed by cold biopsy forceps. IMPLANTS:  None. ESTIMATED BLOOD LOSS:  Minimal.    FINDINGS:  1. Cecal polyp. 2.  Pedunculated proximal ascending polyp. 3.  Mid ascending colon polyp. 4.  Polyp at 60 cm.  5.  Internal and external hemorrhoids. DISPOSITION:  Stable. PROCEDURE:  The patient was brought to the operative theater. Monitoring devices and nasal cannula O2 were placed per Anesthesia. The patient was placed in left side down decubitus position. IV sedation was administered and a time-out was performed. Externally examining the rectum, there appeared to be external and internal hemorrhoids which showed some signs of recent bleeding. There was not a significant amount of prolapse. I could not clearly identify if fissure was present. Digital rectal exam was performed which revealed no other abnormalities. A well-lubricated Olympus colonoscope was introduced in the patient's rectum and advanced to the cecum. The cecum was identified by identification of the ileocecal valve and the appendiceal orifice. There was conization  of the cecum. The prep was adequate to proceed. The scope was then slowly withdrawn where the mucosal surfaces of the colon circumferentially evaluated.   In the cecum, there was a very small polypoid lesion that was removed in its entirety by cold biopsy forceps. In the proximal ascending colon just distal to the ileocecal valve was a pedunculated lesion which was removed in its entirety by hot snare technique with specimen retrieved. This was about 1 cm in size. In the mid ascending colon, there was a very small polypoid lesion which was removed with cold biopsy forceps. The patient also had scattered diverticulosis that was present throughout his colon. The scope was then withdrawn from the ascending colon with no additional lesions seen. In the transverse colon other than the diverticulosis, no other lesions were seen. In the descending and sigmoid colon, we saw more extensive diverticulosis. At 60 cm, a small polyp was identified and removed in its entirety with a single bite of the cold biopsy forceps. The scope was then pulled back into the rectum and retroflexed which revealed internal hemorrhoids which showed some signs of excoriation. The scope was then straightened out and carefully withdrawn. It was felt that the patient's bright red blood per rectum is secondary to his hemorrhoidal disease. All the polyps that were identified were removed. The patient tolerated the procedure well and was transferred to PACU in stable condition.       Kiana Edward MD      [id number]  D:  09/28/2021 13:39  T:  09/28/2021 20:37  JOB #:  [id number]  CC:  Donnice Siemens, MD

## 2021-10-11 ENCOUNTER — OFFICE VISIT (OUTPATIENT)
Dept: SURGERY | Age: 66
End: 2021-10-11
Payer: COMMERCIAL

## 2021-10-11 VITALS
DIASTOLIC BLOOD PRESSURE: 74 MMHG | WEIGHT: 208 LBS | BODY MASS INDEX: 29.78 KG/M2 | SYSTOLIC BLOOD PRESSURE: 132 MMHG | HEART RATE: 80 BPM | HEIGHT: 70 IN

## 2021-10-11 DIAGNOSIS — Z86.010 HX OF COLONIC POLYPS: Primary | ICD-10-CM

## 2021-10-11 PROCEDURE — 99213 OFFICE O/P EST LOW 20 MIN: CPT | Performed by: SURGERY

## 2021-10-11 NOTE — LETTER
10/11/2021    Patient: Carlitos López   YOB: 1955   Date of Visit: 10/11/2021     Calderon Ramirez MD  [address] [place] [id number]  [address] [facility] [address]    Dear Calderon Ramirez MD,      Thank you for referring Mr. Bob Raza to [facility] for evaluation. My notes for this consultation are attached. If you have questions, please do not hesitate to call me. I look forward to following your patient along with you.       Sincerely,    Sarath Watts MD

## 2021-10-11 NOTE — PROGRESS NOTES
[address] [facility] Surgery      Clinic Note - Follow up    Subjective     Steve Jordan returns for scheduled follow up today. He is status post colonoscopy that was back on September 28. Findings of 3 adenomatous polyps were shared as well as diverticulosis. He states he had did fairly well after the procedure until 3 days post procedure when he developed nausea vomiting and diarrhea that lasted for about 12 hours. He is not sure nor my if this is related to his procedure. In any rate he is currently doing fine. He continues to have fluctuations with his bowel habits but states this is been the case for the majority of his life. Objective     Visit Vitals  /74   Pulse 80   Ht 5' 10\" (1.778 m)   Wt 208 lb (94.3 kg)   BMI 29.84 kg/m²         PE  GEN - Awake, alert, communicating appropriately. NAD      Assessment     Steve Jordan is a 61 y. o.yr old male status post colonoscopy with findings of 3 adenomatous polyps. Plan     I have recommended that he try adjusting the timing of his fiber supplementation to see if this improves the timing of his bowel movements. He states that he has Metamucil and wants to try this as opposed to the Gummies. I have encouraged him to try taking the Metamucil in the morning for 1 week and if this does not result in a good result switching to taking it in the afternoon for 1 week and if this fails to improve his symptoms taking it in the evening for 1 week. Hopefully 1 of these 3 times will result in a more consistent time for his bowel movements. I have also recommended a repeat colonoscopy in 3 years based on the number of adenomatous polyps that were found on this colonoscopy.     Salley Buerger, MD    CC: Dr. Suraj Monterroso

## 2021-10-11 NOTE — PATIENT INSTRUCTIONS
High-Fiber Diet: Care Instructions  Overview     A high-fiber diet may help you relieve constipation and feel less bloated. Your doctor and dietitian will help you make a high-fiber eating plan based on your personal needs. The plan will include the things you like to eat. It will also make sure that you get 25 to 35 grams of fiber a day. Before you make changes to the way you eat, be sure to talk with your doctor or dietitian. Follow-up care is a key part of your treatment and safety. Be sure to make and go to all appointments, and call your doctor if you are having problems. It's also a good idea to know your test results and keep a list of the medicines you take. How can you care for yourself at home? · You can increase how much fiber you get if you eat more of certain foods. These foods include:  ? Whole-grain breads and cereals. ? Fruits, such as pears, apples, and peaches. Eat the skins and peels if you can.  ? Vegetables, such as broccoli, cabbage, spinach, carrots, asparagus, and squash. ? Starchy vegetables. These include potatoes with skins, kidney beans, and lima beans. · Take a fiber supplement every day if your doctor recommends it. Examples are Benefiber, Citrucel, FiberCon, and Metamucil. Ask your doctor how much to take. · Drink plenty of fluids. If you have kidney, heart, or liver disease and have to limit fluids, talk with your doctor before you increase the amount of fluids you drink. Where can you learn more? Go to http://www.gray.com/  Enter I736 in the search box to learn more about \"High-Fiber Diet: Care Instructions. \"  Current as of: December 17, 2020               Content Version: 13.0  © 3264-8535 [employer]. Care instructions adapted under license by [employer] (which disclaims liability or warranty for this information).  If you have questions about a medical condition or this instruction, always ask your healthcare professional. [address] any warranty or liability for your use of this information.

## 2021-12-21 ENCOUNTER — OFFICE VISIT (OUTPATIENT)
Dept: FAMILY MEDICINE CLINIC | Age: 66
End: 2021-12-21
Payer: COMMERCIAL

## 2021-12-21 VITALS
OXYGEN SATURATION: 99 % | HEART RATE: 84 BPM | HEIGHT: 70 IN | SYSTOLIC BLOOD PRESSURE: 136 MMHG | TEMPERATURE: 97.8 F | WEIGHT: 209 LBS | BODY MASS INDEX: 29.92 KG/M2 | RESPIRATION RATE: 18 BRPM | DIASTOLIC BLOOD PRESSURE: 76 MMHG

## 2021-12-21 DIAGNOSIS — M17.11 PRIMARY OSTEOARTHRITIS OF RIGHT KNEE: ICD-10-CM

## 2021-12-21 DIAGNOSIS — M25.561 ARTHRALGIA OF RIGHT KNEE: Primary | ICD-10-CM

## 2021-12-21 PROCEDURE — 20610 DRAIN/INJ JOINT/BURSA W/O US: CPT | Performed by: FAMILY MEDICINE

## 2021-12-21 RX ORDER — TRIAMCINOLONE ACETONIDE 40 MG/ML
40 INJECTION, SUSPENSION INTRA-ARTICULAR; INTRAMUSCULAR ONCE
Status: COMPLETED | OUTPATIENT
Start: 2021-12-21 | End: 2021-12-21

## 2021-12-21 RX ORDER — TRIAMCINOLONE ACETONIDE 40 MG/ML
40 INJECTION, SUSPENSION INTRA-ARTICULAR; INTRAMUSCULAR ONCE
Status: DISCONTINUED | OUTPATIENT
Start: 2021-12-21 | End: 2021-12-21

## 2021-12-21 RX ADMIN — TRIAMCINOLONE ACETONIDE 40 MG: 40 INJECTION, SUSPENSION INTRA-ARTICULAR; INTRAMUSCULAR at 12:00

## 2021-12-21 NOTE — PROGRESS NOTES
1. Have you been to the ER, urgent care clinic since your last visit? Hospitalized since your last visit? No    2. Have you seen or consulted any other health care providers outside of the [address] [place] [address] since your last visit? Include any pap smears or colon screening.  No     Chief Complaint   Patient presents with    Knee Pain     right knee pain X 3 weeks     Visit Vitals  BP (!) 147/75 (BP 1 Location: Left arm, BP Patient Position: Sitting)   Pulse 84   Temp 97.8 °F (36.6 °C) (Temporal)   Resp 18   Ht 5' 10\" (1.778 m)   Wt 209 lb (94.8 kg)   SpO2 99%   BMI 29.99 kg/m²

## 2021-12-21 NOTE — PROGRESS NOTES
[facility] PRIMARY CARE AT [address] [facility]  OFFICE PROCEDURE PROGRESS NOTE        Chart reviewed for the following:   Wang Aguilar LPN, have reviewed the History, Physical and updated the Allergic reactions for [facility] performed immediately prior to start of procedure:   Meghan HIGGINBOTHAM LPN, have performed the following reviews on Jalyn  prior to the start of the procedure:            * Patient was identified by name and date of birth   * Agreement on procedure being performed was verified  * Risks and Benefits explained to the patient by Beto Collins MD  * Procedure site verified and marked as necessary  * Patient was positioned for comfort  * Consent was signed and verified     Time: 9841      Date of procedure: 12/21/2021    Procedure performed by:  Beto Collins MD    Provider assisted by: none     Patient assisted by: self    Pre Procedural Pain Scale: 6    Procedure start time:  0047      How tolerated by patient: tolerated the procedure well with no complications    Post Procedural Pain Scale: 0 - No Hurt    Comments: none    Post op instructions and patient education reviewed. Patient states understanding.     Copy of discharge instructions given to patient in AVS.

## 2021-12-21 NOTE — PROGRESS NOTES
Apple Gee is a 61 y.o. male who presents with the following:  Chief Complaint   Patient presents with    Knee Pain     right knee pain X 3 weeks       Patient has had a difficult time with his right knee and has had several operations on it but currently over the past 3 weeks it is beginning to hurt him again and he cannot sleep. No Known Allergies    Current Outpatient Medications   Medication Sig    losartan (COZAAR) 100 mg tablet TAKE 1 TABLET BY MOUTH EVERY DAY    aspirin (ASPIRIN) 325 mg tablet Take 81 mg by mouth daily. No current facility-administered medications for this visit. Past Medical History:   Diagnosis Date    Hypertension        Past Surgical History:   Procedure Laterality Date    COLONOSCOPY N/A 9/28/2021    COLONOSCOPY performed by Luis Matthews MD at [address]  2009    part of intestines removed       Family History   Problem Relation Age of Onset    Cancer Father        Social History     Socioeconomic History    Marital status:    Tobacco Use    Smoking status: Never Smoker    Smokeless tobacco: Never Used   Substance and Sexual Activity    Alcohol use: Yes     Alcohol/week: 20.0 standard drinks     Types: 20 Cans of beer per week       Review of Systems   Constitutional: Negative for chills, fever, malaise/fatigue and weight loss. HENT: Negative for congestion, hearing loss, sore throat and tinnitus. Eyes: Negative for blurred vision, pain and discharge. Respiratory: Negative for cough, shortness of breath and wheezing. Cardiovascular: Negative for chest pain, palpitations, orthopnea, claudication and leg swelling. Gastrointestinal: Negative for abdominal pain, constipation and heartburn. Genitourinary: Negative for dysuria, frequency and urgency. Musculoskeletal: Positive for joint pain. Negative for falls and myalgias. Skin: Negative for itching and rash.    Neurological: Negative for dizziness, tingling, tremors and headaches. Endo/Heme/Allergies: Negative for environmental allergies and polydipsia. Psychiatric/Behavioral: Negative for depression and substance abuse. The patient is not nervous/anxious. Visit Vitals  /76   Pulse 84   Temp 97.8 °F (36.6 °C) (Temporal)   Resp 18   Ht 5' 10\" (1.778 m)   Wt 209 lb (94.8 kg)   SpO2 99%   BMI 29.99 kg/m²     Physical Exam  Vitals reviewed. Constitutional:       Appearance: Normal appearance. HENT:      Head: Normocephalic and atraumatic. Right Ear: Tympanic membrane, ear canal and external ear normal.      Left Ear: Tympanic membrane, ear canal and external ear normal.      Nose: Nose normal. No congestion or rhinorrhea. Mouth/Throat:      Mouth: Mucous membranes are moist.      Pharynx: No oropharyngeal exudate or posterior oropharyngeal erythema. Eyes:      Extraocular Movements: Extraocular movements intact. Conjunctiva/sclera: Conjunctivae normal.      Pupils: Pupils are equal, round, and reactive to light. Comments: Pupil iris and anterior chamber normal.   Neck:      Trachea: No tracheal deviation. Cardiovascular:      Rate and Rhythm: Normal rate and regular rhythm. Pulses: Normal pulses. Heart sounds: Normal heart sounds. No murmur heard. No friction rub. No gallop. Pulmonary:      Effort: Pulmonary effort is normal. No respiratory distress. Breath sounds: Normal breath sounds. No wheezing, rhonchi or rales. Chest:      Chest wall: No tenderness. Abdominal:      General: Bowel sounds are normal. There is no distension. Palpations: Abdomen is soft. There is no mass. Tenderness: There is no abdominal tenderness. There is no guarding or rebound. Hernia: No hernia is present. Musculoskeletal:         General: Tenderness (Right knee joint especially medially) present. Normal range of motion. Cervical back: Normal range of motion and neck supple. Lymphadenopathy:      Cervical: No cervical adenopathy. Skin:     General: Skin is warm and dry. Findings: No erythema or rash. Neurological:      General: No focal deficit present. Mental Status: He is alert and oriented to person, place, and time. Cranial Nerves: No cranial nerve deficit. Motor: No abnormal muscle tone. Deep Tendon Reflexes: Reflexes are normal and symmetric. Reflexes normal.      Comments: Cranial nerves II through XII intact sensory and motor. Deep tendon reflexes in the biceps triceps knee and ankle are normal and bilaterally symmetrical.   Psychiatric:         Mood and Affect: Mood normal.         Behavior: Behavior normal.           ICD-10-CM ICD-9-CM    1. Arthralgia of right knee  M25.561 719.46 triamcinolone acetonide (KENALOG-40) 40 mg/mL injection 40 mg      XR KNEE RT MIN 4 V      DRAIN/INJECT LARGE JOINT/BURSA   2. Primary osteoarthritis of right knee  M17.11 715.16        Orders Placed This Encounter    DRAIN/INJECT LARGE JOINT/BURSA    XR KNEE RT MIN 4 V     Standing Status:   Future     Standing Expiration Date:   1/21/2022    triamcinolone acetonide (KENALOG-40) 40 mg/mL injection 40 mg   the procedure of bursal/joint injection was explained to the pt including risks/benefits and the pt elected to proceed so the skin over the medial anterior aspect of the right knee to be injected was cleaned with alcohol and one ml [40 mg Kenalog] was injected into the bursa and anesthesia was obtained with 1% lidocaine 2 ml total-the pt tolerated the procedure well and ambulated out of the clinic on his own. Patient was told to go to the emergency room or come back to the office if he develops swelling and redness or fever and chills.           Nia Colunga MD

## 2021-12-21 NOTE — PATIENT INSTRUCTIONS
Learning About Joint Injections  What are joint injections? Joint injections are shots into a joint, such as the knee or shoulder. They are used to put in medicines, such as pain relievers and steroid medicines. Steroids can help reduce inflammation. A steroid shot can sometimes help with short-term pain relief when other treatments haven't worked. How are they done? First, the area over the joint will be cleaned. Your doctor may then use a tiny needle to numb the skin in the area where you will get the joint injection. If a tiny needle is used to numb the area, your doctor will use another needle to inject the medicine. Your doctor may use a pain reliever, a steroid, or both. You may feel some pressure or discomfort. Your doctor may put ice on the area before you go home. What can you expect after a joint injection? You will probably go home soon after your shot. You may have numbness around the joint for a few hours. If your shot included both a pain reliever and a steroid, then the pain will probably go away right away. But it might come back after a few hours. This might happen if the pain reliever wears off and the steroid hasn't started to work yet. Steroids don't always work. But when they do, the pain relief can last for several days to a few months or longer. Your doctor may tell you to use ice on the area. You can also use ice if the pain comes back. Put ice or a cold pack on your joint for 10 to 20 minutes at a time. Put a thin cloth between the ice and your skin. Follow your doctor's instructions carefully. Follow-up care is a key part of your treatment and safety. Be sure to make and go to all appointments, and call your doctor if you are having problems. It's also a good idea to know your test results and keep a list of the medicines you take. Where can you learn more?   Go to http://www.gray.com/  Enter N961 in the search box to learn more about \"Learning About Joint Injections. \"  Current as of: July 1, 2021               Content Version: 13.0  © 9211-0351 [employer]. Care instructions adapted under license by [employer] (which disclaims liability or warranty for this information). If you have questions about a medical condition or this instruction, always ask your healthcare professional. [place] [address] any warranty or liability for your use of this information.

## 2022-02-21 DIAGNOSIS — I10 ESSENTIAL HYPERTENSION: ICD-10-CM

## 2022-02-21 RX ORDER — LOSARTAN POTASSIUM 100 MG/1
TABLET ORAL
Qty: 30 TABLET | Refills: 3 | Status: SHIPPED | OUTPATIENT
Start: 2022-02-21 | End: 2022-06-01 | Stop reason: SDUPTHER

## 2022-03-18 PROBLEM — I10 ESSENTIAL HYPERTENSION: Status: ACTIVE | Noted: 2019-03-07

## 2022-03-19 PROBLEM — E66.811 CLASS 1 OBESITY DUE TO EXCESS CALORIES WITH SERIOUS COMORBIDITY AND BODY MASS INDEX (BMI) OF 31.0 TO 31.9 IN ADULT: Status: ACTIVE | Noted: 2020-03-11

## 2022-03-19 PROBLEM — Z11.59 ENCOUNTER FOR HEPATITIS C SCREENING TEST FOR LOW RISK PATIENT: Status: ACTIVE | Noted: 2020-03-11

## 2022-03-19 PROBLEM — E66.09 CLASS 1 OBESITY DUE TO EXCESS CALORIES WITH SERIOUS COMORBIDITY AND BODY MASS INDEX (BMI) OF 31.0 TO 31.9 IN ADULT: Status: ACTIVE | Noted: 2020-03-11

## 2022-03-19 PROBLEM — N40.0 BPH WITHOUT OBSTRUCTION/LOWER URINARY TRACT SYMPTOMS: Status: ACTIVE | Noted: 2020-03-11

## 2022-03-19 PROBLEM — E78.00 HYPERCHOLESTEROLEMIA: Status: ACTIVE | Noted: 2020-09-18

## 2022-03-20 PROBLEM — M17.11 PRIMARY OSTEOARTHRITIS OF RIGHT KNEE: Status: ACTIVE | Noted: 2021-12-21

## 2022-06-01 ENCOUNTER — TELEPHONE (OUTPATIENT)
Dept: FAMILY MEDICINE CLINIC | Age: 67
End: 2022-06-01

## 2022-06-01 DIAGNOSIS — I10 ESSENTIAL HYPERTENSION: ICD-10-CM

## 2022-06-01 RX ORDER — LOSARTAN POTASSIUM 100 MG/1
100 TABLET ORAL DAILY
Qty: 10 TABLET | Refills: 0 | Status: SHIPPED | OUTPATIENT
Start: 2022-06-01 | End: 2022-06-10 | Stop reason: SDUPTHER

## 2022-06-01 NOTE — TELEPHONE ENCOUNTER
Pt has an appt on June 10th. He is out of medication and is wondering if you would be willing to call in enough of his Losartan 100mg tablet to get him through until the 10th. Please send to [employer] if approved.

## 2022-06-10 ENCOUNTER — OFFICE VISIT (OUTPATIENT)
Dept: FAMILY MEDICINE CLINIC | Age: 67
End: 2022-06-10
Payer: MEDICARE

## 2022-06-10 VITALS
RESPIRATION RATE: 16 BRPM | SYSTOLIC BLOOD PRESSURE: 138 MMHG | OXYGEN SATURATION: 97 % | HEIGHT: 70 IN | DIASTOLIC BLOOD PRESSURE: 78 MMHG | WEIGHT: 211.4 LBS | HEART RATE: 80 BPM | TEMPERATURE: 98.3 F | BODY MASS INDEX: 30.26 KG/M2

## 2022-06-10 DIAGNOSIS — E78.00 HYPERCHOLESTEROLEMIA: ICD-10-CM

## 2022-06-10 DIAGNOSIS — N40.0 BENIGN PROSTATIC HYPERPLASIA WITHOUT LOWER URINARY TRACT SYMPTOMS: ICD-10-CM

## 2022-06-10 DIAGNOSIS — E66.09 CLASS 1 OBESITY DUE TO EXCESS CALORIES WITH SERIOUS COMORBIDITY AND BODY MASS INDEX (BMI) OF 31.0 TO 31.9 IN ADULT: ICD-10-CM

## 2022-06-10 DIAGNOSIS — N40.0 BPH WITHOUT OBSTRUCTION/LOWER URINARY TRACT SYMPTOMS: ICD-10-CM

## 2022-06-10 DIAGNOSIS — I10 ESSENTIAL HYPERTENSION: Primary | ICD-10-CM

## 2022-06-10 PROCEDURE — 36415 COLL VENOUS BLD VENIPUNCTURE: CPT | Performed by: FAMILY MEDICINE

## 2022-06-10 PROCEDURE — 99214 OFFICE O/P EST MOD 30 MIN: CPT | Performed by: FAMILY MEDICINE

## 2022-06-10 RX ORDER — LOSARTAN POTASSIUM 100 MG/1
100 TABLET ORAL DAILY
Qty: 90 TABLET | Refills: 1 | Status: SHIPPED | OUTPATIENT
Start: 2022-06-10

## 2022-06-10 NOTE — PROGRESS NOTES
Chief Complaint   Patient presents with    Hypertension       1. \"Have you been to the ER, urgent care clinic since your last visit? Hospitalized since your last visit? \" No    2. \"Have you seen or consulted any other health care providers outside of the [address] [place] [address] since your last visit? \" No     3. For patients aged 39-70: Has the patient had a colonoscopy / FIT/ Cologuard? Yes - no Care Gap present      If the patient is female:    4. For patients aged 41-77: Has the patient had a mammogram within the past 2 years? NA - based on age or sex      11. For patients aged 21-65: Has the patient had a pap smear? NA - based on age or sex      Identified pt with two pt identifiers(name and ). Reviewed record in preparation for visit and have obtained necessary documentation.     Symptom review:    NO  Fever   NO  Shaking chills  NO  Cough  NO Headaches  NO  Body aches  NO  Coughing up blood  NO  Chest congestion  NO  Chest pain  NO  Shortness of breath  NO  Profound Loss of smell/taste  NO  Nausea/Vomiting   NO  Loose stool/Diarrhea  NO  any skin issues

## 2022-06-10 NOTE — PROGRESS NOTES
Lia Bernal is a 74 y.o. male who presents with the following:  Chief Complaint   Patient presents with    Hypertension    Cholesterol Problem    Benign Prostatic Hypertrophy       Patient's blood pressure is doing well on current medication without chest pain shortness of breath nor edema. Patient is trying to control his cholesterol with his diet and is having no claudication nor any chest pain. Patient does have BPH without any lower urinary tract symptoms and his last PSA was 2 years ago. No Known Allergies    Current Outpatient Medications   Medication Sig    losartan (COZAAR) 100 mg tablet Take 1 Tablet by mouth daily.  aspirin (ASPIRIN) 325 mg tablet Take 81 mg by mouth daily. No current facility-administered medications for this visit. Past Medical History:   Diagnosis Date    Hypertension        Past Surgical History:   Procedure Laterality Date    COLONOSCOPY N/A 2021    COLONOSCOPY performed by Darren Patel MD at [facility] [address] [facility] [address] [facility] UNLISTED      part of intestines removed       Family History   Problem Relation Age of Onset    Cancer Father        Social History     Socioeconomic History    Marital status:    Tobacco Use    Smoking status: Never Smoker    Smokeless tobacco: Never Used   Vaping Use    Vaping Use: Never used   Substance and Sexual Activity    Alcohol use: Yes     Alcohol/week: 20.0 standard drinks     Types: 20 Cans of beer per week    Drug use: Never    Sexual activity: Yes       Review of Systems   Constitutional: Negative for chills, fever, malaise/fatigue and weight loss. HENT: Negative for congestion, hearing loss, sore throat and tinnitus. Eyes: Negative for blurred vision, pain and discharge. Respiratory: Negative for cough, shortness of breath and wheezing. Cardiovascular: Negative for chest pain, palpitations, orthopnea, claudication and leg swelling.    Gastrointestinal: Negative for abdominal pain, constipation and heartburn. Genitourinary: Negative for dysuria, frequency and urgency. Musculoskeletal: Negative for falls, joint pain and myalgias. Skin: Negative for itching and rash. Neurological: Negative for dizziness, tingling, tremors and headaches. Endo/Heme/Allergies: Negative for environmental allergies and polydipsia. Psychiatric/Behavioral: Negative for depression and substance abuse. The patient is not nervous/anxious. There was irritated and having to come in for an examination to have his medicines refilled. Visit Vitals  /78   Pulse 80   Temp 98.3 °F (36.8 °C) (Oral)   Resp 16   Ht 5' 10\" (1.778 m)   Wt 211 lb 6.4 oz (95.9 kg)   SpO2 97%   BMI 30.33 kg/m²     Physical Exam  Vitals reviewed. Constitutional:       General: He is not in acute distress. Appearance: Normal appearance. He is obese. He is not ill-appearing. HENT:      Head: Normocephalic and atraumatic. Right Ear: Tympanic membrane, ear canal and external ear normal.      Left Ear: Tympanic membrane, ear canal and external ear normal.      Nose: Nose normal. No congestion or rhinorrhea. Mouth/Throat:      Mouth: Mucous membranes are moist.      Pharynx: No oropharyngeal exudate or posterior oropharyngeal erythema. Eyes:      Extraocular Movements: Extraocular movements intact. Conjunctiva/sclera: Conjunctivae normal.      Pupils: Pupils are equal, round, and reactive to light. Comments: Pupil iris and anterior chamber normal.   Neck:      Trachea: No tracheal deviation. Cardiovascular:      Rate and Rhythm: Normal rate and regular rhythm. Pulses: Normal pulses. Heart sounds: Normal heart sounds. No murmur heard. No friction rub. No gallop. Pulmonary:      Effort: Pulmonary effort is normal. No respiratory distress. Breath sounds: Normal breath sounds. No wheezing, rhonchi or rales. Chest:      Chest wall: No tenderness. Abdominal:      General: Bowel sounds are normal. There is no distension. Palpations: Abdomen is soft. There is no mass. Tenderness: There is no abdominal tenderness. There is no guarding or rebound. Hernia: No hernia is present. Musculoskeletal:         General: No tenderness. Normal range of motion. Cervical back: Normal range of motion and neck supple. Right lower leg: No edema. Left lower leg: No edema. Lymphadenopathy:      Cervical: No cervical adenopathy. Skin:     General: Skin is warm and dry. Findings: No erythema or rash. Neurological:      General: No focal deficit present. Mental Status: He is alert. Cranial Nerves: No cranial nerve deficit. Sensory: No sensory deficit. Motor: No abnormal muscle tone. Gait: Gait normal.      Deep Tendon Reflexes: Reflexes are normal and symmetric. Reflexes normal.      Comments: Cranial nerves II through XII intact sensory and motor. Deep tendon reflexes in the biceps triceps knee and ankle are normal and bilaterally symmetrical.   Psychiatric:         Mood and Affect: Mood normal.         Behavior: Behavior normal.         Thought Content: Thought content normal.         Judgment: Judgment normal.           ICD-10-CM ICD-9-CM    1. Essential hypertension  I10 401.9 CBC WITH AUTOMATED DIFF      METABOLIC PANEL, COMPREHENSIVE      LIPID PANEL      losartan (COZAAR) 100 mg tablet      COLLECTION VENOUS BLOOD,VENIPUNCTURE   2. Benign prostatic hyperplasia without lower urinary tract symptoms  N40.0 600.00 PSA W/ REFLX FREE PSA   3. BPH without obstruction/lower urinary tract symptoms  N40.0 600.00    4. Class 1 obesity due to excess calories with serious comorbidity and body mass index (BMI) of 31.0 to 31.9 in adult  E66.09 278.00     Z68.31 V85.31    5.  Hypercholesterolemia  E78.00 272.0        Orders Placed This Encounter    PSA W/ REFLX FREE PSA     Standing Status:   Future     Standing Expiration Date:   6/10/2023    CBC WITH AUTOMATED DIFF     Standing Status:   Future     Standing Expiration Date:   4/43/7077    METABOLIC PANEL, COMPREHENSIVE     Standing Status:   Future     Standing Expiration Date:   7/10/2022    LIPID PANEL     Standing Status:   Future     Standing Expiration Date:   7/10/2022    COLLECTION VENOUS BLOOD,VENIPUNCTURE    losartan (COZAAR) 100 mg tablet     Sig: Take 1 Tablet by mouth daily. Dispense:  90 Tablet     Refill:  1       Follow-up and Dispositions    · Return in about 6 months (around 12/10/2022).          Celina Ortiz MD

## 2022-06-11 LAB
ALBUMIN SERPL-MCNC: 4.1 G/DL (ref 3.5–5)
ALBUMIN/GLOB SERPL: 1.5 {RATIO} (ref 1.1–2.2)
ALP SERPL-CCNC: 47 U/L (ref 45–117)
ALT SERPL-CCNC: 34 U/L (ref 12–78)
ANION GAP SERPL CALC-SCNC: 4 MMOL/L (ref 5–15)
AST SERPL-CCNC: 27 U/L (ref 15–37)
BASOPHILS # BLD: 0 K/UL (ref 0–0.1)
BASOPHILS NFR BLD: 0 % (ref 0–1)
BILIRUB SERPL-MCNC: 1 MG/DL (ref 0.2–1)
BUN SERPL-MCNC: 13 MG/DL (ref 6–20)
BUN/CREAT SERPL: 13 (ref 12–20)
CALCIUM SERPL-MCNC: 9.2 MG/DL (ref 8.5–10.1)
CHLORIDE SERPL-SCNC: 107 MMOL/L (ref 97–108)
CHOLEST SERPL-MCNC: 208 MG/DL
CO2 SERPL-SCNC: 27 MMOL/L (ref 21–32)
CREAT SERPL-MCNC: 1.03 MG/DL (ref 0.7–1.3)
DIFFERENTIAL METHOD BLD: NORMAL
EOSINOPHIL # BLD: 0.1 K/UL (ref 0–0.4)
EOSINOPHIL NFR BLD: 2 % (ref 0–7)
ERYTHROCYTE [DISTWIDTH] IN BLOOD BY AUTOMATED COUNT: 13.1 % (ref 11.5–14.5)
GLOBULIN SER CALC-MCNC: 2.8 G/DL (ref 2–4)
GLUCOSE SERPL-MCNC: 118 MG/DL (ref 65–100)
HCT VFR BLD AUTO: 44.3 % (ref 36.6–50.3)
HDLC SERPL-MCNC: 77 MG/DL
HDLC SERPL: 2.7 {RATIO} (ref 0–5)
HGB BLD-MCNC: 15 G/DL (ref 12.1–17)
IMM GRANULOCYTES # BLD AUTO: 0 K/UL (ref 0–0.04)
IMM GRANULOCYTES NFR BLD AUTO: 0 % (ref 0–0.5)
LDLC SERPL CALC-MCNC: 114.6 MG/DL (ref 0–100)
LYMPHOCYTES # BLD: 1.9 K/UL (ref 0.8–3.5)
LYMPHOCYTES NFR BLD: 40 % (ref 12–49)
MCH RBC QN AUTO: 32.1 PG (ref 26–34)
MCHC RBC AUTO-ENTMCNC: 33.9 G/DL (ref 30–36.5)
MCV RBC AUTO: 94.9 FL (ref 80–99)
MONOCYTES # BLD: 0.4 K/UL (ref 0–1)
MONOCYTES NFR BLD: 9 % (ref 5–13)
NEUTS SEG # BLD: 2.3 K/UL (ref 1.8–8)
NEUTS SEG NFR BLD: 48 % (ref 32–75)
NRBC # BLD: 0 K/UL (ref 0–0.01)
NRBC BLD-RTO: 0 PER 100 WBC
PLATELET # BLD AUTO: 177 K/UL (ref 150–400)
POTASSIUM SERPL-SCNC: 4.6 MMOL/L (ref 3.5–5.1)
PROT SERPL-MCNC: 6.9 G/DL (ref 6.4–8.2)
RBC # BLD AUTO: 4.67 M/UL (ref 4.1–5.7)
SODIUM SERPL-SCNC: 138 MMOL/L (ref 136–145)
TRIGL SERPL-MCNC: 82 MG/DL (ref ?–150)
VLDLC SERPL CALC-MCNC: 16.4 MG/DL
WBC # BLD AUTO: 4.8 K/UL (ref 4.1–11.1)

## 2022-06-12 LAB
PSA SERPL-MCNC: 2 NG/ML (ref 0–4)
REFLEX CRITERIA: NORMAL

## 2023-01-12 ENCOUNTER — TELEPHONE (OUTPATIENT)
Dept: FAMILY MEDICINE CLINIC | Age: 68
End: 2023-01-12

## 2023-01-12 ENCOUNTER — OFFICE VISIT (OUTPATIENT)
Dept: FAMILY MEDICINE CLINIC | Age: 68
End: 2023-01-12
Payer: MEDICARE

## 2023-01-12 ENCOUNTER — HOSPITAL ENCOUNTER (OUTPATIENT)
Dept: GENERAL RADIOLOGY | Age: 68
Discharge: HOME | End: 2023-01-12
Payer: MEDICARE

## 2023-01-12 VITALS
HEART RATE: 70 BPM | SYSTOLIC BLOOD PRESSURE: 132 MMHG | DIASTOLIC BLOOD PRESSURE: 78 MMHG | TEMPERATURE: 97.3 F | BODY MASS INDEX: 30.64 KG/M2 | HEIGHT: 70 IN | OXYGEN SATURATION: 97 % | WEIGHT: 214 LBS | RESPIRATION RATE: 20 BRPM

## 2023-01-12 DIAGNOSIS — I10 ESSENTIAL HYPERTENSION: Primary | ICD-10-CM

## 2023-01-12 DIAGNOSIS — N40.0 BPH WITHOUT OBSTRUCTION/LOWER URINARY TRACT SYMPTOMS: ICD-10-CM

## 2023-01-12 DIAGNOSIS — M17.0 PRIMARY OSTEOARTHRITIS OF BOTH KNEES: ICD-10-CM

## 2023-01-12 DIAGNOSIS — E66.09 CLASS 1 OBESITY DUE TO EXCESS CALORIES WITH SERIOUS COMORBIDITY AND BODY MASS INDEX (BMI) OF 31.0 TO 31.9 IN ADULT: ICD-10-CM

## 2023-01-12 DIAGNOSIS — E78.00 HYPERCHOLESTEROLEMIA: ICD-10-CM

## 2023-01-12 PROCEDURE — G8427 DOCREV CUR MEDS BY ELIG CLIN: HCPCS | Performed by: FAMILY MEDICINE

## 2023-01-12 PROCEDURE — 73564 X-RAY EXAM KNEE 4 OR MORE: CPT

## 2023-01-12 PROCEDURE — 1101F PT FALLS ASSESS-DOCD LE1/YR: CPT | Performed by: FAMILY MEDICINE

## 2023-01-12 PROCEDURE — 99214 OFFICE O/P EST MOD 30 MIN: CPT | Performed by: FAMILY MEDICINE

## 2023-01-12 PROCEDURE — G8417 CALC BMI ABV UP PARAM F/U: HCPCS | Performed by: FAMILY MEDICINE

## 2023-01-12 PROCEDURE — 3017F COLORECTAL CA SCREEN DOC REV: CPT | Performed by: FAMILY MEDICINE

## 2023-01-12 PROCEDURE — G8536 NO DOC ELDER MAL SCRN: HCPCS | Performed by: FAMILY MEDICINE

## 2023-01-12 PROCEDURE — 36415 COLL VENOUS BLD VENIPUNCTURE: CPT | Performed by: FAMILY MEDICINE

## 2023-01-12 PROCEDURE — 3075F SYST BP GE 130 - 139MM HG: CPT | Performed by: FAMILY MEDICINE

## 2023-01-12 PROCEDURE — 3078F DIAST BP <80 MM HG: CPT | Performed by: FAMILY MEDICINE

## 2023-01-12 PROCEDURE — G8510 SCR DEP NEG, NO PLAN REQD: HCPCS | Performed by: FAMILY MEDICINE

## 2023-01-12 PROCEDURE — 1123F ACP DISCUSS/DSCN MKR DOCD: CPT | Performed by: FAMILY MEDICINE

## 2023-01-12 RX ORDER — LOSARTAN POTASSIUM 100 MG/1
100 TABLET ORAL DAILY
Qty: 90 TABLET | Refills: 1 | Status: SHIPPED | OUTPATIENT
Start: 2023-01-12

## 2023-01-12 NOTE — PROGRESS NOTES
Called Patient    Patient identified by Name and Date of birth. Results given as directed by provider.

## 2023-01-12 NOTE — TELEPHONE ENCOUNTER
Received a call from Cachorro Zamudio at [facility] and she stated she was able to get the pt an appt for Monday. States they do not need the referral but just OV notes, demo sheet, med list to be faxed. Faxed to [address] office.

## 2023-01-12 NOTE — PROGRESS NOTES
Called patient. Patient identified by Name and Date of birth. Results given as directed by provider.

## 2023-01-12 NOTE — PROGRESS NOTES
Raina Calixto is a 74 y.o. male who presents with the following:  Chief Complaint   Patient presents with    Hypertension    Cholesterol Problem    Knee Pain       Patient is doing well on his losartan not having any chest pain shortness of breath nor edema. The patient does need his cholesterol rechecked as it was elevated at the last meeting and the patient is put on a few pounds which may mean that his lipids are high enough that we could treat them. Both of the patient's knees have significant arthritis with palpable and audible crepitus when he bends him when he is working out and now there hurting bad enough that he is willing to undergo surgery again. Patient requesting orthopedic referral.  The knees have not been x-rayed in about 40 years. The patient's BPH is not bothering him and his last PSA was 2. No Known Allergies    Current Outpatient Medications   Medication Sig    losartan (COZAAR) 100 mg tablet Take 1 Tablet by mouth daily. Indications: high blood pressure    aspirin (ASPIRIN) 325 mg tablet Take 81 mg by mouth daily. No current facility-administered medications for this visit. Past Medical History:   Diagnosis Date    Hypertension        Past Surgical History:   Procedure Laterality Date    COLONOSCOPY N/A 9/28/2021    COLONOSCOPY performed by Ayanna Mcconnell MD at [facility] MAIN OR     [facility]  2009    part of intestines removed       Family History   Problem Relation Age of Onset    Cancer Father        Social History     Socioeconomic History    Marital status:    Tobacco Use    Smoking status: Never    Smokeless tobacco: Never   Vaping Use    Vaping Use: Never used   Substance and Sexual Activity    Alcohol use:  Yes     Alcohol/week: 20.0 standard drinks     Types: 20 Cans of beer per week    Drug use: Never    Sexual activity: Yes       Review of Systems   Constitutional:  Negative for chills, fever, malaise/fatigue and weight loss. HENT:  Negative for congestion, hearing loss, sore throat and tinnitus. Eyes:  Negative for blurred vision, pain and discharge. Respiratory:  Negative for cough, shortness of breath and wheezing. Cardiovascular:  Negative for chest pain, palpitations, orthopnea, claudication and leg swelling. Gastrointestinal:  Negative for abdominal pain, constipation and heartburn. Genitourinary:  Negative for dysuria, frequency and urgency. Musculoskeletal:  Negative for falls, joint pain and myalgias. Skin:  Negative for itching and rash. Neurological:  Negative for dizziness, tingling, tremors and headaches. Endo/Heme/Allergies:  Negative for environmental allergies and polydipsia. Psychiatric/Behavioral:  Negative for depression and substance abuse. The patient is not nervous/anxious. Visit Vitals  /78   Pulse 70   Temp 97.3 °F (36.3 °C)   Resp 20   Ht 5' 10\" (1.778 m)   Wt 214 lb (97.1 kg)   SpO2 97%   BMI 30.71 kg/m²     Physical Exam  Vitals reviewed. Constitutional:       General: He is not in acute distress. Appearance: Normal appearance. He is well-developed. He is not toxic-appearing. HENT:      Head: Normocephalic and atraumatic. Right Ear: Tympanic membrane, ear canal and external ear normal.      Left Ear: Tympanic membrane, ear canal and external ear normal.      Nose: Nose normal. No congestion or rhinorrhea. Mouth/Throat:      Mouth: Mucous membranes are moist.      Pharynx: No oropharyngeal exudate or posterior oropharyngeal erythema. Eyes:      General:         Right eye: No discharge. Left eye: No discharge. Extraocular Movements: Extraocular movements intact. Conjunctiva/sclera: Conjunctivae normal.      Pupils: Pupils are equal, round, and reactive to light. Comments: Pupil iris and anterior chamber normal.   Neck:      Trachea: No tracheal deviation.    Cardiovascular:      Rate and Rhythm: Normal rate and regular rhythm. Pulses: Normal pulses. Heart sounds: Normal heart sounds. No murmur heard. No friction rub. No gallop. Pulmonary:      Effort: Pulmonary effort is normal. No respiratory distress. Breath sounds: Normal breath sounds. No wheezing, rhonchi or rales. Chest:      Chest wall: No tenderness. Abdominal:      General: Bowel sounds are normal. There is no distension. Palpations: Abdomen is soft. There is no mass. Tenderness: There is no abdominal tenderness. There is no guarding or rebound. Hernia: No hernia is present. Musculoskeletal:         General: Deformity (Valgus deformity in both knees.) present. No swelling or tenderness. Normal range of motion. Cervical back: Normal range of motion and neck supple. Comments: Patient's knees have palpable crepitus when he flexes and extends them. Lymphadenopathy:      Cervical: No cervical adenopathy. Skin:     General: Skin is warm and dry. Coloration: Skin is not pale. Findings: No erythema or rash. Neurological:      General: No focal deficit present. Mental Status: He is alert. Cranial Nerves: No cranial nerve deficit. Sensory: No sensory deficit. Motor: No abnormal muscle tone. Deep Tendon Reflexes: Reflexes are normal and symmetric. Reflexes normal.      Comments: Cranial nerves II through XII intact sensory and motor. Deep tendon reflexes in the biceps triceps knee and ankle are normal and bilaterally symmetrical.   Psychiatric:         Mood and Affect: Mood normal.         Behavior: Behavior normal.         ICD-10-CM ICD-9-CM    1. Essential hypertension  I10 401.9 losartan (COZAAR) 100 mg tablet      2. Class 1 obesity due to excess calories with serious comorbidity and body mass index (BMI) of 31.0 to 31.9 in adult  E66.09 278.00     Z68.31 V85.31       3. Hypercholesterolemia  E78.00 272.0       4.  BPH without obstruction/lower urinary tract symptoms N40.0 600.00       5. Primary osteoarthritis of both knees  M17.0 715.16 XR KNEE LT MAX 2 VWS      REFERRAL TO ORTHOPEDICS          Orders Placed This Encounter    XR KNEE LT MAX 2 VWS     Standing Status:   Future     Standing Expiration Date:   7/12/2023     Order Specific Question:   Reason for Exam     Answer:   Deformity and pain in both knees    REFERRAL TO ORTHOPEDICS     Referral Priority:   Routine     Referral Type:   Consultation     Referral Reason:   Specialty Services Required     Referred to Provider:   Carmen Smiley MD    losartan (COZAAR) 100 mg tablet     Sig: Take 1 Tablet by mouth daily. Indications: high blood pressure     Dispense:  90 Tablet     Refill:  1       Follow-up and Dispositions    Return in about 6 months (around 7/12/2023), or if symptoms worsen or fail to improve.          Guy Rodriguez MD

## 2023-01-12 NOTE — PROGRESS NOTES
1. \"Have you been to the ER, urgent care clinic since your last visit? Hospitalized since your last visit? \" No    2. \"Have you seen or consulted any other health care providers outside of the [address] [place] [address] since your last visit? \" No     3. For patients aged 39-70: Has the patient had a colonoscopy / FIT/ Cologuard? Yes - no Care Gap present     If the patient is female:    4. For patients aged 41-77: Has the patient had a mammogram within the past 2 years? NA - based on age    11. For patients aged 21-65: Has the patient had a pap smear?  NA - based on age

## 2023-01-12 NOTE — ADDENDUM NOTE
Addended by: Fabrizio Caballero  on: 1/12/2023 08:12 AM     Modules accepted: Orders, Level of Service

## 2023-01-13 ENCOUNTER — DOCUMENTATION ONLY (OUTPATIENT)
Dept: FAMILY MEDICINE CLINIC | Age: 68
End: 2023-01-13

## 2023-01-13 LAB
ALBUMIN SERPL-MCNC: 4 G/DL (ref 3.5–5)
ALBUMIN/GLOB SERPL: 1.4 (ref 1.1–2.2)
ALP SERPL-CCNC: 48 U/L (ref 45–117)
ALT SERPL-CCNC: 35 U/L (ref 12–78)
ANION GAP SERPL CALC-SCNC: 6 MMOL/L (ref 5–15)
AST SERPL-CCNC: 30 U/L (ref 15–37)
BILIRUB SERPL-MCNC: 0.7 MG/DL (ref 0.2–1)
BUN SERPL-MCNC: 14 MG/DL (ref 6–20)
BUN/CREAT SERPL: 13 (ref 12–20)
CALCIUM SERPL-MCNC: 9.3 MG/DL (ref 8.5–10.1)
CHLORIDE SERPL-SCNC: 107 MMOL/L (ref 97–108)
CHOLEST SERPL-MCNC: 219 MG/DL
CO2 SERPL-SCNC: 27 MMOL/L (ref 21–32)
CREAT SERPL-MCNC: 1.04 MG/DL (ref 0.7–1.3)
GLOBULIN SER CALC-MCNC: 2.9 G/DL (ref 2–4)
GLUCOSE SERPL-MCNC: 102 MG/DL (ref 65–100)
HDLC SERPL-MCNC: 78 MG/DL
HDLC SERPL: 2.8 (ref 0–5)
LDLC SERPL CALC-MCNC: 125.4 MG/DL (ref 0–100)
POTASSIUM SERPL-SCNC: 4.3 MMOL/L (ref 3.5–5.1)
PROT SERPL-MCNC: 6.9 G/DL (ref 6.4–8.2)
SODIUM SERPL-SCNC: 140 MMOL/L (ref 136–145)
TRIGL SERPL-MCNC: 78 MG/DL (ref ?–150)
VLDLC SERPL CALC-MCNC: 15.6 MG/DL

## 2023-01-23 ENCOUNTER — OFFICE VISIT (OUTPATIENT)
Dept: FAMILY MEDICINE CLINIC | Age: 68
End: 2023-01-23
Payer: MEDICARE

## 2023-01-23 VITALS
SYSTOLIC BLOOD PRESSURE: 124 MMHG | HEIGHT: 70 IN | RESPIRATION RATE: 20 BRPM | DIASTOLIC BLOOD PRESSURE: 76 MMHG | WEIGHT: 213 LBS | BODY MASS INDEX: 30.49 KG/M2 | HEART RATE: 76 BPM | OXYGEN SATURATION: 98 % | TEMPERATURE: 97.7 F

## 2023-01-23 DIAGNOSIS — N40.0 BPH WITHOUT OBSTRUCTION/LOWER URINARY TRACT SYMPTOMS: ICD-10-CM

## 2023-01-23 DIAGNOSIS — Z00.00 ENCOUNTER FOR ANNUAL WELLNESS EXAM IN MEDICARE PATIENT: Primary | ICD-10-CM

## 2023-01-23 DIAGNOSIS — M17.0 PRIMARY OSTEOARTHRITIS OF BOTH KNEES: ICD-10-CM

## 2023-01-23 DIAGNOSIS — I10 ESSENTIAL HYPERTENSION: ICD-10-CM

## 2023-01-23 DIAGNOSIS — Z00.00 WELCOME TO MEDICARE PREVENTIVE VISIT: ICD-10-CM

## 2023-01-23 PROCEDURE — 99213 OFFICE O/P EST LOW 20 MIN: CPT | Performed by: FAMILY MEDICINE

## 2023-01-23 PROCEDURE — G8427 DOCREV CUR MEDS BY ELIG CLIN: HCPCS | Performed by: FAMILY MEDICINE

## 2023-01-23 PROCEDURE — G8432 DEP SCR NOT DOC, RNG: HCPCS | Performed by: FAMILY MEDICINE

## 2023-01-23 PROCEDURE — 3078F DIAST BP <80 MM HG: CPT | Performed by: FAMILY MEDICINE

## 2023-01-23 PROCEDURE — 3074F SYST BP LT 130 MM HG: CPT | Performed by: FAMILY MEDICINE

## 2023-01-23 PROCEDURE — G8536 NO DOC ELDER MAL SCRN: HCPCS | Performed by: FAMILY MEDICINE

## 2023-01-23 PROCEDURE — 1123F ACP DISCUSS/DSCN MKR DOCD: CPT | Performed by: FAMILY MEDICINE

## 2023-01-23 PROCEDURE — G0402 INITIAL PREVENTIVE EXAM: HCPCS | Performed by: FAMILY MEDICINE

## 2023-01-23 PROCEDURE — 1101F PT FALLS ASSESS-DOCD LE1/YR: CPT | Performed by: FAMILY MEDICINE

## 2023-01-23 PROCEDURE — G0405 EKG INTERPRET & REPORT PREVE: HCPCS | Performed by: FAMILY MEDICINE

## 2023-01-23 PROCEDURE — 93005 ELECTROCARDIOGRAM TRACING: CPT | Performed by: FAMILY MEDICINE

## 2023-01-23 PROCEDURE — 3017F COLORECTAL CA SCREEN DOC REV: CPT | Performed by: FAMILY MEDICINE

## 2023-01-23 PROCEDURE — G8417 CALC BMI ABV UP PARAM F/U: HCPCS | Performed by: FAMILY MEDICINE

## 2023-01-23 NOTE — PROGRESS NOTES
Catherine Fields is a 74 y.o. male who presents with the following:  Chief Complaint   Patient presents with    Knee Pain     Bilateral      Annual Wellness Visit       I talked to the patient regarding his bilateral knee pain and plans on doing a total knee replacement on the right. Patient stated that his last injection is lasted about 6 months and he is doing fine on his legs and I told him to keep his knees as long as he could as they would better than anything they could replace them with. The only difference is that when his knee started hurting so bad he cannot stand it and the replacement becomes necessary. Patient states that he has been bowlegged all of his life and he really was not interested in any more extensive surgery than was necessary. The patient's blood pressure is doing well on current medication without chest pain shortness of breath nor edema. Patient does have BPH without obstruction or lower urinary tract symptoms and he states he has a good stream at this time. No Known Allergies    Current Outpatient Medications   Medication Sig    losartan (COZAAR) 100 mg tablet Take 1 Tablet by mouth daily. Indications: high blood pressure    aspirin (ASPIRIN) 325 mg tablet Take 81 mg by mouth daily. No current facility-administered medications for this visit. Past Medical History:   Diagnosis Date    Hypertension        Past Surgical History:   Procedure Laterality Date    COLONOSCOPY N/A 9/28/2021    COLONOSCOPY performed by Mason Mcneill MD at [facility] MAIN OR     [facility]  2009    part of intestines removed       Family History   Problem Relation Age of Onset    Cancer Father        Social History     Socioeconomic History    Marital status:    Tobacco Use    Smoking status: Never    Smokeless tobacco: Never   Vaping Use    Vaping Use: Never used   Substance and Sexual Activity    Alcohol use:  Yes     Alcohol/week: 20.0 standard drinks     Types: 20 Cans of beer per week    Drug use: Never    Sexual activity: Yes     Social Determinants of Health     Financial Resource Strain: Low Risk     Difficulty of Paying Living Expenses: Not hard at all   Food Insecurity: No Food Insecurity    Worried About Running Out of Food in the Last Year: Never true    Ran Out of Food in the Last Year: Never true       Review of Systems   Constitutional:  Negative for chills, fever, malaise/fatigue and weight loss. HENT:  Negative for congestion, hearing loss, sore throat and tinnitus. Eyes:  Negative for blurred vision, pain and discharge. Respiratory:  Negative for cough, shortness of breath and wheezing. Cardiovascular:  Negative for chest pain, palpitations, orthopnea, claudication and leg swelling. Gastrointestinal:  Negative for abdominal pain, constipation and heartburn. Genitourinary:  Negative for dysuria, frequency and urgency. Musculoskeletal:  Positive for joint pain. Negative for falls and myalgias. Skin:  Negative for itching and rash. Neurological:  Negative for dizziness, tingling, tremors and headaches. Endo/Heme/Allergies:  Negative for environmental allergies and polydipsia. Psychiatric/Behavioral:  Negative for depression and substance abuse. The patient is not nervous/anxious. Visit Vitals  /76 (BP 1 Location: Left upper arm)   Pulse 76   Temp 97.7 °F (36.5 °C) (Oral)   Resp 20   Ht 5' 10\" (1.778 m)   Wt 213 lb (96.6 kg)   SpO2 98%   BMI 30.56 kg/m²     Physical Exam  Vitals reviewed. Constitutional:       General: He is not in acute distress. Appearance: Normal appearance. He is well-developed. He is not ill-appearing or toxic-appearing. Comments: Patient is a muscular man and is certainly overweight but not obese. HENT:      Head: Normocephalic and atraumatic.       Right Ear: Tympanic membrane, ear canal and external ear normal.      Left Ear: Tympanic membrane, ear canal and external ear normal.      Nose: Nose normal. No congestion or rhinorrhea. Mouth/Throat:      Mouth: Mucous membranes are moist.      Pharynx: No oropharyngeal exudate or posterior oropharyngeal erythema. Eyes:      General:         Right eye: No discharge. Left eye: No discharge. Extraocular Movements: Extraocular movements intact. Conjunctiva/sclera: Conjunctivae normal.      Pupils: Pupils are equal, round, and reactive to light. Comments: Pupil iris and anterior chamber normal.   Neck:      Trachea: No tracheal deviation. Cardiovascular:      Rate and Rhythm: Normal rate and regular rhythm. Pulses: Normal pulses. Heart sounds: Normal heart sounds. No murmur heard. No friction rub. No gallop. Pulmonary:      Effort: Pulmonary effort is normal. No respiratory distress. Breath sounds: Normal breath sounds. No wheezing, rhonchi or rales. Chest:      Chest wall: No tenderness. Abdominal:      General: Bowel sounds are normal. There is no distension. Palpations: Abdomen is soft. There is no mass. Tenderness: There is no abdominal tenderness. There is no guarding or rebound. Hernia: No hernia is present. Musculoskeletal:         General: No swelling or tenderness. Normal range of motion. Cervical back: Normal range of motion and neck supple. Right lower leg: No edema. Left lower leg: No edema. Comments: Both knees have crepitus on movement. Lymphadenopathy:      Cervical: No cervical adenopathy. Skin:     General: Skin is warm and dry. Coloration: Skin is not pale. Findings: No erythema or rash. Neurological:      General: No focal deficit present. Mental Status: He is alert and oriented to person, place, and time. Cranial Nerves: No cranial nerve deficit. Sensory: No sensory deficit. Motor: No abnormal muscle tone. Deep Tendon Reflexes: Reflexes are normal and symmetric.  Reflexes normal. Comments: Cranial nerves II through XII intact sensory and motor. Deep tendon reflexes in the biceps triceps knee and ankle are normal and bilaterally symmetrical.   Psychiatric:         Mood and Affect: Mood normal.         Behavior: Behavior normal.         Thought Content: Thought content normal.         Judgment: Judgment normal.         ICD-10-CM ICD-9-CM    1. Encounter for annual wellness exam in Medicare patient  Z00.00 V70.0 AMB POC EKG ROUTINE W/ 12 LEADS, INTER & REP      2. Welcome to Medicare preventive visit  Z00.00 V70.0       3. Essential hypertension  I10 401.9       4. BPH without obstruction/lower urinary tract symptoms  N40.0 600.00       5. Primary osteoarthritis of both knees  M17.0 715.16           Orders Placed This Encounter    AMB POC EKG ROUTINE W/ 12 LEADS, INTER & REP     Order Specific Question:   Reason for Exam:     Answer: Annual Wellness       Follow-up and Dispositions    Return in about 6 months (around 7/23/2023), or if symptoms worsen or fail to improve.          Diamond Solorzano MD

## 2023-01-23 NOTE — PROGRESS NOTES
Identified pt with two pt identifiers(name and ). Reviewed record in preparation for visit and have obtained necessary documentation. Chief Complaint   Patient presents with    Knee Pain     Bilateral      Annual Wellness Visit       1. \"Have you been to the ER, urgent care clinic since your last visit? Hospitalized since your last visit? \" No    2. \"Have you seen or consulted any other health care providers outside of the [address] [place] [address] since your last visit? \" No     3. For patients aged 39-70: Has the patient had a colonoscopy / FIT/ Cologuard? Yes - no Care Gap present      If the patient is female:    4. For patients aged 41-77: Has the patient had a mammogram within the past 2 years? NA - based on age or sex      11. For patients aged 21-65: Has the patient had a pap smear? NA - based on age or sex      This is a \"Welcome to [employer]"  Initial Preventive Physical Examination (IPPE) providing Personalized Prevention Plan Services (Performed in the first 12 months of enrollment)    I have reviewed the patient's medical history in detail and updated the computerized patient record. Assessment/Plan   Education and counseling provided:  Are appropriate based on today's review and evaluation    1. Encounter for annual wellness exam in Medicare patient  -     AMB POC EKG ROUTINE  LEADS, INTER & REP  2. Welcome to Medicare preventive visit       Depression Risk Screen     3 most recent PHQ Screens 2023   Little interest or pleasure in doing things Not at all   Feeling down, depressed, irritable, or hopeless Not at all   Total Score PHQ 2 0       Alcohol & Drug Abuse Risk Screen    Do you average more than 1 drink per night or more than 7 drinks a week: No    In the past three months have you have had more than 4 drinks containing alcohol on one occasion: No          Functional Ability and Level of Safety    Diet: No special diet      Hearing: Hearing is good.       Vision Screening:  Vision is good. No results found. Activities of Daily Living: The home contains: no safety equipment. Patient does total self care      Ambulation: with no difficulty      Exercise level: moderately active     Fall Risk Screen:  Fall Risk Assessment, last 12 mths 1/12/2023   Able to walk? Yes   Fall in past 12 months? 0   Do you feel unsteady? 0   Are you worried about falling 0      Abuse Screen:  Patient is not abused       Screening EKG   EKG order placed: Yes    End of Life Planning   Advanced care planning directives were discussed with the patient and /or family/caregiver. Health Maintenance Due     Health Maintenance Due   Topic Date Due    Shingles Vaccine (1 of 2) Never done    COVID-19 Vaccine (3 - Booster for Moderna series) 06/16/2021    Pneumococcal 65+ years (1 - PCV) Never done    Flu Vaccine (1) 08/01/2022       Patient Care Team   Patient Care Team:  Ct Escobedo MD as PCP - General (Family Medicine)  Ct Escobedo MD as PCP - [facility]  Francois Medina MD (General Surgery)    History     Past Medical History:   Diagnosis Date    Hypertension       Past Surgical History:   Procedure Laterality Date    COLONOSCOPY N/A 9/28/2021    COLONOSCOPY performed by Francois Medina MD at [address] [place] [address]  2009    part of intestines removed     Current Outpatient Medications   Medication Sig Dispense Refill    losartan (COZAAR) 100 mg tablet Take 1 Tablet by mouth daily. Indications: high blood pressure 90 Tablet 1    aspirin (ASPIRIN) 325 mg tablet Take 81 mg by mouth daily. No Known Allergies    Family History   Problem Relation Age of Onset    Cancer Father      Social History     Tobacco Use    Smoking status: Never    Smokeless tobacco: Never   Substance Use Topics    Alcohol use:  Yes     Alcohol/week: 20.0 standard drinks     Types: 20 Cans of beer per week       Yogesh Oliva CRISTIANO Medina

## 2023-01-23 NOTE — PATIENT INSTRUCTIONS
Medicare Wellness Visit, Male    The best way to live healthy is to have a lifestyle where you eat a well-balanced diet, exercise regularly, limit alcohol use, and quit all forms of tobacco/nicotine, if applicable. Regular preventive services are another way to keep healthy. Preventive services (vaccines, screening tests, monitoring & exams) can help personalize your care plan, which helps you manage your own care. Screening tests can find health problems at the earliest stages, when they are easiest to treat. [facility] follows the current, evidence-based guidelines published by the [facility] Ezequiel Josias ([facility]) when recommending preventive services for our patients. Because we follow these guidelines, sometimes recommendations change over time as research supports it. (For example, a prostate screening blood test is no longer routinely recommended for men with no symptoms). Of course, you and your doctor may decide to screen more often for some diseases, based on your risk and co-morbidities (chronic disease you are already diagnosed with). Preventive services for you include:  - Medicare offers their members a free annual wellness visit, which is time for you and your primary care provider to discuss and plan for your preventive service needs.  Take advantage of this benefit every year!    -Over the age of 72 should receive the recommended pneumonia vaccines.   -All adults should have a flu vaccine yearly.  -All adults should receive a tetanus vaccine every 10 years.  -Over the age of 48 should receive the shingles vaccines.    -All adults should be screened once for Hepatitis C.  -All adults age 38-68 who are overweight should have a diabetes screening test once every three years.   -Other screening tests & preventive services for persons with diabetes include: an eye exam to screen for diabetic retinopathy, a kidney function test, a foot exam, and stricter control over your cholesterol.   -Cardiovascular screening for adults with routine risk involves an electrocardiogram (ECG) at intervals determined by the provider.     -Colorectal cancer screening should be done for adults age 43-69 with no increased risk factors for colorectal cancer. There are a number of acceptable methods of screening for this type of cancer. Each test has its own benefits and drawbacks. Discuss with your provider what is most appropriate for you during your annual wellness visit. The different tests include: colonoscopy (considered the best screening method), a fecal occult blood test, a fecal DNA test, and sigmoidoscopy.    -Lung cancer screening is recommended annually with a low dose CT scan for adults between age 54 and 68, who have smoked at least 30 pack years (equivalent of 1 pack per day for 30 days), and who is a current smoker or quit less than 15 years ago. -An Abdominal Aortic Aneurysm (AAA) Screening is recommended for men age 73-68 who has ever smoked in their lifetime. Here is a list of your current Health Maintenance items (your personalized list of preventive services) with a due date:  Health Maintenance Due   Topic Date Due    Shingles Vaccine (1 of 2) Never done    COVID-19 Vaccine (3 - Booster for Moderna series) 06/16/2021    Pneumococcal Vaccine (1 - PCV) Never done    Yearly Flu Vaccine (1) 08/01/2022       Medicare Wellness Visit, Male    The best way to live healthy is to have a lifestyle where you eat a well-balanced diet, exercise regularly, limit alcohol use, and quit all forms of tobacco/nicotine, if applicable. Regular preventive services are another way to keep healthy. Preventive services (vaccines, screening tests, monitoring & exams) can help personalize your care plan, which helps you manage your own care. Screening tests can find health problems at the earliest stages, when they are easiest to treat.    [facility] [place] the current, evidence-based guidelines published by the [place] (USPSTF) when recommending preventive services for our patients. Because we follow these guidelines, sometimes recommendations change over time as research supports it. (For example, a prostate screening blood test is no longer routinely recommended for men with no symptoms). Of course, you and your doctor may decide to screen more often for some diseases, based on your risk and co-morbidities (chronic disease you are already diagnosed with). Preventive services for you include:  - Medicare offers their members a free annual wellness visit, which is time for you and your primary care provider to discuss and plan for your preventive service needs. Take advantage of this benefit every year!    -Over the age of 72 should receive the recommended pneumonia vaccines.   -All adults should have a flu vaccine yearly.  -All adults should receive a tetanus vaccine every 10 years.  -Over the age of 48 should receive the shingles vaccines.    -All adults should be screened once for Hepatitis C.  -All adults age 38-68 who are overweight should have a diabetes screening test once every three years.   -Other screening tests & preventive services for persons with diabetes include: an eye exam to screen for diabetic retinopathy, a kidney function test, a foot exam, and stricter control over your cholesterol.   -Cardiovascular screening for adults with routine risk involves an electrocardiogram (ECG) at intervals determined by the provider.     -Colorectal cancer screening should be done for adults age 43-69 with no increased risk factors for colorectal cancer. There are a number of acceptable methods of screening for this type of cancer. Each test has its own benefits and drawbacks. Discuss with your provider what is most appropriate for you during your annual wellness visit.  The different tests include: colonoscopy (considered the best screening method), a fecal occult blood test, a fecal DNA test, and sigmoidoscopy.    -Lung cancer screening is recommended annually with a low dose CT scan for adults between age 54 and 68, who have smoked at least 30 pack years (equivalent of 1 pack per day for 30 days), and who is a current smoker or quit less than 15 years ago. -An Abdominal Aortic Aneurysm (AAA) Screening is recommended for men age 73-68 who has ever smoked in their lifetime. Here is a list of your current Health Maintenance items (your personalized list of preventive services) with a due date:  Health Maintenance Due   Topic Date Due    Shingles Vaccine (1 of 2) Never done    COVID-19 Vaccine (3 - Booster for Moderna series) 06/16/2021    Pneumococcal Vaccine (1 - PCV) Never done    Yearly Flu Vaccine (1) 08/01/2022       Medicare Wellness Visit, Male    The best way to live healthy is to have a lifestyle where you eat a well-balanced diet, exercise regularly, limit alcohol use, and quit all forms of tobacco/nicotine, if applicable. Regular preventive services are another way to keep healthy. Preventive services (vaccines, screening tests, monitoring & exams) can help personalize your care plan, which helps you manage your own care. Screening tests can find health problems at the earliest stages, when they are easiest to treat. [facility] follows the current, evidence-based guidelines published by the [place] Ezequiel Josias (USPSTF) when recommending preventive services for our patients. Because we follow these guidelines, sometimes recommendations change over time as research supports it. (For example, a prostate screening blood test is no longer routinely recommended for men with no symptoms). Of course, you and your doctor may decide to screen more often for some diseases, based on your risk and co-morbidities (chronic disease you are already diagnosed with). Preventive services for you include:  - Medicare offers their members a free annual wellness visit, which is time for you and your primary care provider to discuss and plan for your preventive service needs. Take advantage of this benefit every year!    -Over the age of 72 should receive the recommended pneumonia vaccines.   -All adults should have a flu vaccine yearly.  -All adults should receive a tetanus vaccine every 10 years.  -Over the age of 48 should receive the shingles vaccines.    -All adults should be screened once for Hepatitis C.  -All adults age 38-68 who are overweight should have a diabetes screening test once every three years.   -Other screening tests & preventive services for persons with diabetes include: an eye exam to screen for diabetic retinopathy, a kidney function test, a foot exam, and stricter control over your cholesterol.   -Cardiovascular screening for adults with routine risk involves an electrocardiogram (ECG) at intervals determined by the provider.     -Colorectal cancer screening should be done for adults age 43-69 with no increased risk factors for colorectal cancer. There are a number of acceptable methods of screening for this type of cancer. Each test has its own benefits and drawbacks. Discuss with your provider what is most appropriate for you during your annual wellness visit. The different tests include: colonoscopy (considered the best screening method), a fecal occult blood test, a fecal DNA test, and sigmoidoscopy.    -Lung cancer screening is recommended annually with a low dose CT scan for adults between age 54 and 68, who have smoked at least 30 pack years (equivalent of 1 pack per day for 30 days), and who is a current smoker or quit less than 15 years ago. -An Abdominal Aortic Aneurysm (AAA) Screening is recommended for men age 73-68 who has ever smoked in their lifetime.      Here is a list of your current Health Maintenance items (your personalized list of preventive services) with a due date:  Health Maintenance Due   Topic Date Due    Shingles Vaccine (1 of 2) Never done    COVID-19 Vaccine (3 - Booster for Moderna series) 06/16/2021    Pneumococcal Vaccine (1 - PCV) Never done    Yearly Flu Vaccine (1) 08/01/2022       Medicare Wellness Visit, Male    The best way to live healthy is to have a lifestyle where you eat a well-balanced diet, exercise regularly, limit alcohol use, and quit all forms of tobacco/nicotine, if applicable. Regular preventive services are another way to keep healthy. Preventive services (vaccines, screening tests, monitoring & exams) can help personalize your care plan, which helps you manage your own care. Screening tests can find health problems at the earliest stages, when they are easiest to treat. [facility] follows the current, evidence-based guidelines published by the [facility] Ezequiel Ferrara ([facility]) when recommending preventive services for our patients. Because we follow these guidelines, sometimes recommendations change over time as research supports it. (For example, a prostate screening blood test is no longer routinely recommended for men with no symptoms). Of course, you and your doctor may decide to screen more often for some diseases, based on your risk and co-morbidities (chronic disease you are already diagnosed with). Preventive services for you include:  - Medicare offers their members a free annual wellness visit, which is time for you and your primary care provider to discuss and plan for your preventive service needs.  Take advantage of this benefit every year!    -Over the age of 72 should receive the recommended pneumonia vaccines.   -All adults should have a flu vaccine yearly.  -All adults should receive a tetanus vaccine every 10 years.  -Over the age of 48 should receive the shingles vaccines.    -All adults should be screened once for Hepatitis C.  -All adults age 38-68 who are overweight should have a diabetes screening test once every three years.   -Other screening tests & preventive services for persons with diabetes include: an eye exam to screen for diabetic retinopathy, a kidney function test, a foot exam, and stricter control over your cholesterol.   -Cardiovascular screening for adults with routine risk involves an electrocardiogram (ECG) at intervals determined by the provider.     -Colorectal cancer screening should be done for adults age 43-69 with no increased risk factors for colorectal cancer. There are a number of acceptable methods of screening for this type of cancer. Each test has its own benefits and drawbacks. Discuss with your provider what is most appropriate for you during your annual wellness visit. The different tests include: colonoscopy (considered the best screening method), a fecal occult blood test, a fecal DNA test, and sigmoidoscopy.    -Lung cancer screening is recommended annually with a low dose CT scan for adults between age 54 and 68, who have smoked at least 30 pack years (equivalent of 1 pack per day for 30 days), and who is a current smoker or quit less than 15 years ago. -An Abdominal Aortic Aneurysm (AAA) Screening is recommended for men age 73-68 who has ever smoked in their lifetime.      Here is a list of your current Health Maintenance items (your personalized list of preventive services) with a due date:  Health Maintenance Due   Topic Date Due    Shingles Vaccine (1 of 2) Never done    COVID-19 Vaccine (3 - Booster for Moderna series) 06/16/2021    Pneumococcal Vaccine (1 - PCV) Never done    Yearly Flu Vaccine (1) 08/01/2022

## 2023-08-01 DIAGNOSIS — I10 ESSENTIAL (PRIMARY) HYPERTENSION: ICD-10-CM

## 2023-08-01 RX ORDER — LOSARTAN POTASSIUM 100 MG/1
TABLET ORAL
Qty: 90 TABLET | Refills: 1 | OUTPATIENT
Start: 2023-08-01

## 2023-08-23 ENCOUNTER — OFFICE VISIT (OUTPATIENT)
Age: 68
End: 2023-08-23
Payer: MEDICARE

## 2023-08-23 VITALS
BODY MASS INDEX: 29.63 KG/M2 | HEIGHT: 70 IN | DIASTOLIC BLOOD PRESSURE: 68 MMHG | RESPIRATION RATE: 20 BRPM | OXYGEN SATURATION: 94 % | WEIGHT: 207 LBS | SYSTOLIC BLOOD PRESSURE: 118 MMHG | HEART RATE: 63 BPM | TEMPERATURE: 98.2 F

## 2023-08-23 DIAGNOSIS — M17.0 PRIMARY OSTEOARTHRITIS OF BOTH KNEES: ICD-10-CM

## 2023-08-23 DIAGNOSIS — G89.4 CHRONIC PAIN SYNDROME: ICD-10-CM

## 2023-08-23 DIAGNOSIS — I10 ESSENTIAL HYPERTENSION: Primary | ICD-10-CM

## 2023-08-23 DIAGNOSIS — E78.00 HYPERCHOLESTEROLEMIA: ICD-10-CM

## 2023-08-23 DIAGNOSIS — N40.0 BPH WITHOUT OBSTRUCTION/LOWER URINARY TRACT SYMPTOMS: ICD-10-CM

## 2023-08-23 PROBLEM — E66.09 CLASS 1 OBESITY DUE TO EXCESS CALORIES WITH SERIOUS COMORBIDITY AND BODY MASS INDEX (BMI) OF 31.0 TO 31.9 IN ADULT: Status: RESOLVED | Noted: 2020-03-11 | Resolved: 2023-08-23

## 2023-08-23 PROBLEM — E66.811 CLASS 1 OBESITY DUE TO EXCESS CALORIES WITH SERIOUS COMORBIDITY AND BODY MASS INDEX (BMI) OF 31.0 TO 31.9 IN ADULT: Status: RESOLVED | Noted: 2020-03-11 | Resolved: 2023-08-23

## 2023-08-23 PROCEDURE — 1123F ACP DISCUSS/DSCN MKR DOCD: CPT | Performed by: FAMILY MEDICINE

## 2023-08-23 PROCEDURE — 3074F SYST BP LT 130 MM HG: CPT | Performed by: FAMILY MEDICINE

## 2023-08-23 PROCEDURE — 3078F DIAST BP <80 MM HG: CPT | Performed by: FAMILY MEDICINE

## 2023-08-23 PROCEDURE — G8427 DOCREV CUR MEDS BY ELIG CLIN: HCPCS | Performed by: FAMILY MEDICINE

## 2023-08-23 PROCEDURE — 99214 OFFICE O/P EST MOD 30 MIN: CPT | Performed by: FAMILY MEDICINE

## 2023-08-23 PROCEDURE — G8419 CALC BMI OUT NRM PARAM NOF/U: HCPCS | Performed by: FAMILY MEDICINE

## 2023-08-23 PROCEDURE — 3017F COLORECTAL CA SCREEN DOC REV: CPT | Performed by: FAMILY MEDICINE

## 2023-08-23 PROCEDURE — 4004F PT TOBACCO SCREEN RCVD TLK: CPT | Performed by: FAMILY MEDICINE

## 2023-08-23 RX ORDER — LOSARTAN POTASSIUM 100 MG/1
100 TABLET ORAL DAILY
Qty: 90 TABLET | Refills: 1 | Status: SHIPPED | OUTPATIENT
Start: 2023-08-23

## 2023-08-23 SDOH — ECONOMIC STABILITY: HOUSING INSECURITY
IN THE LAST 12 MONTHS, WAS THERE A TIME WHEN YOU DID NOT HAVE A STEADY PLACE TO SLEEP OR SLEPT IN A SHELTER (INCLUDING NOW)?: NO

## 2023-08-23 SDOH — ECONOMIC STABILITY: INCOME INSECURITY: HOW HARD IS IT FOR YOU TO PAY FOR THE VERY BASICS LIKE FOOD, HOUSING, MEDICAL CARE, AND HEATING?: NOT HARD AT ALL

## 2023-08-23 SDOH — ECONOMIC STABILITY: FOOD INSECURITY: WITHIN THE PAST 12 MONTHS, THE FOOD YOU BOUGHT JUST DIDN'T LAST AND YOU DIDN'T HAVE MONEY TO GET MORE.: NEVER TRUE

## 2023-08-23 SDOH — ECONOMIC STABILITY: FOOD INSECURITY: WITHIN THE PAST 12 MONTHS, YOU WORRIED THAT YOUR FOOD WOULD RUN OUT BEFORE YOU GOT MONEY TO BUY MORE.: NEVER TRUE

## 2023-08-23 ASSESSMENT — ANXIETY QUESTIONNAIRES
GAD7 TOTAL SCORE: 0
3. WORRYING TOO MUCH ABOUT DIFFERENT THINGS: 0
7. FEELING AFRAID AS IF SOMETHING AWFUL MIGHT HAPPEN: 0
2. NOT BEING ABLE TO STOP OR CONTROL WORRYING: 0
6. BECOMING EASILY ANNOYED OR IRRITABLE: 0
5. BEING SO RESTLESS THAT IT IS HARD TO SIT STILL: 0
4. TROUBLE RELAXING: 0
1. FEELING NERVOUS, ANXIOUS, OR ON EDGE: 0
IF YOU CHECKED OFF ANY PROBLEMS ON THIS QUESTIONNAIRE, HOW DIFFICULT HAVE THESE PROBLEMS MADE IT FOR YOU TO DO YOUR WORK, TAKE CARE OF THINGS AT HOME, OR GET ALONG WITH OTHER PEOPLE: NOT DIFFICULT AT ALL

## 2023-08-23 ASSESSMENT — ENCOUNTER SYMPTOMS
DIARRHEA: 0
VOICE CHANGE: 0
CONSTIPATION: 0
CHEST TIGHTNESS: 0
BLOOD IN STOOL: 0
SHORTNESS OF BREATH: 0
ANAL BLEEDING: 0
ABDOMINAL PAIN: 0
NAUSEA: 0
BACK PAIN: 0
VOMITING: 0
ABDOMINAL DISTENTION: 0
RHINORRHEA: 0
SINUS PRESSURE: 0
COUGH: 0
SINUS PAIN: 0
WHEEZING: 0
EYE REDNESS: 0
EYE PAIN: 0

## 2023-08-23 ASSESSMENT — PATIENT HEALTH QUESTIONNAIRE - PHQ9
1. LITTLE INTEREST OR PLEASURE IN DOING THINGS: 0
SUM OF ALL RESPONSES TO PHQ QUESTIONS 1-9: 0
SUM OF ALL RESPONSES TO PHQ QUESTIONS 1-9: 0
2. FEELING DOWN, DEPRESSED OR HOPELESS: 0
SUM OF ALL RESPONSES TO PHQ QUESTIONS 1-9: 0
SUM OF ALL RESPONSES TO PHQ9 QUESTIONS 1 & 2: 0
SUM OF ALL RESPONSES TO PHQ QUESTIONS 1-9: 0

## 2023-08-23 NOTE — PROGRESS NOTES
Vida Hannah is a 79 y.o. male who presents with the following:  Chief Complaint   Patient presents with    Hypertension    Arthritis    Knee Pain    Benign Prostatic Hypertrophy       Patient's hypertension has been doing well on losartan 100 mg daily but he does need refills. He has not had any chest pain shortness of breath nor edema. The patient's biggest complaint today is both of his knees are giving him constant pain and least 3 out of 10 and sometimes greater and he had a consultation with Dr. Toni Charlton who refuses to do both knees at 1 time and is giving him the patient's his reasoning for doing 1 at a time. The patient would like a second opinion and I suggested that Dr. Mackenzie broderick because he is down here also and in a separate practice so there is no conflict of interest and he will give him his opinion. Patient does have hypercholesterolemia but so far is not on any statin and he does have some cramps occasionally already it would be probably necessary to start him on a lower dose depending on what his liver functions today show and his cholesterol. Patient also has BPH which is giving him no symptomatology and he has been following his PSAs yearly. He is due. No Known Allergies    Current Outpatient Medications   Medication Sig Dispense Refill    losartan (COZAAR) 100 MG tablet Take 1 tablet by mouth daily 90 tablet 1    aspirin 325 MG tablet Take 81 mg by mouth daily       No current facility-administered medications for this visit.         Past Medical History:   Diagnosis Date    Class 1 obesity due to excess calories with serious comorbidity and body mass index (BMI) of 31.0 to 31.9 in adult 3/11/2020    Hypertension        Past Surgical History:   Procedure Laterality Date    COLONOSCOPY N/A 9/28/2021    COLONOSCOPY performed by Gwenlyn Cowden, MD at [address] [place] [address]  2009    part of intestines removed

## 2023-08-24 LAB
ALBUMIN SERPL-MCNC: 4.3 G/DL (ref 3.5–5)
ALBUMIN/GLOB SERPL: 1.5 (ref 1.1–2.2)
ALP SERPL-CCNC: 49 U/L (ref 45–117)
ALT SERPL-CCNC: 33 U/L (ref 12–78)
ANION GAP SERPL CALC-SCNC: 5 MMOL/L (ref 5–15)
AST SERPL-CCNC: 25 U/L (ref 15–37)
BILIRUB SERPL-MCNC: 0.7 MG/DL (ref 0.2–1)
BUN SERPL-MCNC: 11 MG/DL (ref 6–20)
BUN/CREAT SERPL: 12 (ref 12–20)
CALCIUM SERPL-MCNC: 9.3 MG/DL (ref 8.5–10.1)
CHLORIDE SERPL-SCNC: 108 MMOL/L (ref 97–108)
CHOLEST SERPL-MCNC: 226 MG/DL
CO2 SERPL-SCNC: 26 MMOL/L (ref 21–32)
COMMENT:: NORMAL
CREAT SERPL-MCNC: 0.95 MG/DL (ref 0.7–1.3)
GLOBULIN SER CALC-MCNC: 2.8 G/DL (ref 2–4)
GLUCOSE SERPL-MCNC: 97 MG/DL (ref 65–100)
HDLC SERPL-MCNC: 67 MG/DL
HDLC SERPL: 3.4 (ref 0–5)
LDLC SERPL CALC-MCNC: 142.4 MG/DL (ref 0–100)
POTASSIUM SERPL-SCNC: 4.9 MMOL/L (ref 3.5–5.1)
PROT SERPL-MCNC: 7.1 G/DL (ref 6.4–8.2)
SODIUM SERPL-SCNC: 139 MMOL/L (ref 136–145)
SPECIMEN HOLD: NORMAL
TRIGL SERPL-MCNC: 83 MG/DL
VLDLC SERPL CALC-MCNC: 16.6 MG/DL

## 2023-08-25 LAB
PSA SERPL-MCNC: 2.3 NG/ML (ref 0–4)
REFLEX CRITERIA: NORMAL

## 2023-12-05 ENCOUNTER — TRANSCRIBE ORDERS (OUTPATIENT)
Facility: HOSPITAL | Age: 68
End: 2023-12-05

## 2023-12-05 ENCOUNTER — HOSPITAL ENCOUNTER (OUTPATIENT)
Facility: HOSPITAL | Age: 68
Discharge: HOME | End: 2023-12-08
Payer: MEDICARE

## 2023-12-05 DIAGNOSIS — M17.12 UNILATERAL PRIMARY OSTEOARTHRITIS, LEFT KNEE: ICD-10-CM

## 2023-12-05 DIAGNOSIS — M17.11 UNILATERAL PRIMARY OSTEOARTHRITIS, RIGHT KNEE: ICD-10-CM

## 2023-12-05 DIAGNOSIS — M17.11 UNILATERAL PRIMARY OSTEOARTHRITIS, RIGHT KNEE: Primary | ICD-10-CM

## 2023-12-05 PROCEDURE — 73564 X-RAY EXAM KNEE 4 OR MORE: CPT

## 2023-12-14 ENCOUNTER — TRANSCRIBE ORDERS (OUTPATIENT)
Facility: HOSPITAL | Age: 68
End: 2023-12-14

## 2023-12-14 ENCOUNTER — HOSPITAL ENCOUNTER (OUTPATIENT)
Facility: HOSPITAL | Age: 68
Discharge: HOME | End: 2023-12-14
Payer: MEDICARE

## 2023-12-14 ENCOUNTER — HOSPITAL ENCOUNTER (OUTPATIENT)
Facility: HOSPITAL | Age: 68
End: 2023-12-14
Payer: MEDICARE

## 2023-12-14 DIAGNOSIS — Z01.818 PREOP EXAMINATION: Primary | ICD-10-CM

## 2023-12-14 DIAGNOSIS — Z01.818 PREOP EXAMINATION: ICD-10-CM

## 2023-12-14 LAB
ALBUMIN SERPL-MCNC: 3.7 G/DL (ref 3.5–5)
ALBUMIN/GLOB SERPL: 1.2 (ref 1.1–2.2)
ALP SERPL-CCNC: 42 U/L (ref 45–117)
ALT SERPL-CCNC: 27 U/L (ref 12–78)
ANION GAP SERPL CALC-SCNC: 8 MMOL/L (ref 5–15)
APPEARANCE UR: CLEAR
AST SERPL-CCNC: 19 U/L (ref 15–37)
BASOPHILS # BLD: 0 K/UL (ref 0–0.1)
BASOPHILS NFR BLD: 0 % (ref 0–1)
BILIRUB SERPL-MCNC: 0.8 MG/DL (ref 0.2–1)
BILIRUB UR QL: NEGATIVE
BUN SERPL-MCNC: 12 MG/DL (ref 6–20)
BUN/CREAT SERPL: 13 (ref 12–20)
CALCIUM SERPL-MCNC: 8.8 MG/DL (ref 8.5–10.1)
CHLORIDE SERPL-SCNC: 104 MMOL/L (ref 97–108)
CO2 SERPL-SCNC: 26 MMOL/L (ref 21–32)
COLOR UR: NORMAL
CREAT SERPL-MCNC: 0.94 MG/DL (ref 0.7–1.3)
DIFFERENTIAL METHOD BLD: NORMAL
EOSINOPHIL # BLD: 0.1 K/UL (ref 0–0.4)
EOSINOPHIL NFR BLD: 2 % (ref 0–7)
ERYTHROCYTE [DISTWIDTH] IN BLOOD BY AUTOMATED COUNT: 12.3 % (ref 11.5–14.5)
GLOBULIN SER CALC-MCNC: 3.2 G/DL (ref 2–4)
GLUCOSE SERPL-MCNC: 112 MG/DL (ref 65–100)
GLUCOSE UR STRIP.AUTO-MCNC: NEGATIVE MG/DL
HCT VFR BLD AUTO: 39 % (ref 36.6–50.3)
HGB BLD-MCNC: 13.9 G/DL (ref 12.1–17)
HGB UR QL STRIP: NEGATIVE
IMM GRANULOCYTES # BLD AUTO: 0 K/UL (ref 0–0.04)
IMM GRANULOCYTES NFR BLD AUTO: 0 % (ref 0–0.5)
KETONES UR QL STRIP.AUTO: NEGATIVE MG/DL
LEUKOCYTE ESTERASE UR QL STRIP.AUTO: NEGATIVE
LYMPHOCYTES # BLD: 1.6 K/UL (ref 0.8–3.5)
LYMPHOCYTES NFR BLD: 32 % (ref 12–49)
MCH RBC QN AUTO: 31.6 PG (ref 26–34)
MCHC RBC AUTO-ENTMCNC: 35.6 G/DL (ref 30–36.5)
MCV RBC AUTO: 88.6 FL (ref 80–99)
MONOCYTES # BLD: 0.5 K/UL (ref 0–1)
MONOCYTES NFR BLD: 9 % (ref 5–13)
NEUTS SEG # BLD: 2.8 K/UL (ref 1.8–8)
NEUTS SEG NFR BLD: 57 % (ref 32–75)
NITRITE UR QL STRIP.AUTO: NEGATIVE
NRBC # BLD: 0 K/UL (ref 0–0.01)
NRBC BLD-RTO: 0 PER 100 WBC
PH UR STRIP: 7 (ref 5–8)
PLATELET # BLD AUTO: 167 K/UL (ref 150–400)
PMV BLD AUTO: 10.8 FL (ref 8.9–12.9)
POTASSIUM SERPL-SCNC: 4.1 MMOL/L (ref 3.5–5.1)
PROT SERPL-MCNC: 6.9 G/DL (ref 6.4–8.2)
PROT UR STRIP-MCNC: NEGATIVE MG/DL
RBC # BLD AUTO: 4.4 M/UL (ref 4.1–5.7)
SODIUM SERPL-SCNC: 138 MMOL/L (ref 136–145)
SP GR UR REFRACTOMETRY: 1.01 (ref 1–1.03)
UROBILINOGEN UR QL STRIP.AUTO: 1 EU/DL (ref 0.2–1)
WBC # BLD AUTO: 5 K/UL (ref 4.1–11.1)

## 2023-12-14 PROCEDURE — 80053 COMPREHEN METABOLIC PANEL: CPT

## 2023-12-14 PROCEDURE — 81002 URINALYSIS NONAUTO W/O SCOPE: CPT

## 2023-12-14 PROCEDURE — 85025 COMPLETE CBC W/AUTO DIFF WBC: CPT

## 2023-12-14 PROCEDURE — 71046 X-RAY EXAM CHEST 2 VIEWS: CPT

## 2023-12-14 PROCEDURE — 36415 COLL VENOUS BLD VENIPUNCTURE: CPT

## 2023-12-15 LAB
BACTERIA SPEC CULT: NORMAL
BACTERIA SPEC CULT: NORMAL
SERVICE CMNT-IMP: NORMAL

## 2023-12-17 LAB
EKG ATRIAL RATE: 62 BPM
EKG DIAGNOSIS: NORMAL
EKG P AXIS: 48 DEGREES
EKG P-R INTERVAL: 168 MS
EKG Q-T INTERVAL: 424 MS
EKG QRS DURATION: 72 MS
EKG QTC CALCULATION (BAZETT): 430 MS
EKG R AXIS: 0 DEGREES
EKG T AXIS: 11 DEGREES
EKG VENTRICULAR RATE: 62 BPM

## 2023-12-27 ENCOUNTER — HOSPITAL ENCOUNTER (OUTPATIENT)
Facility: HOSPITAL | Age: 68
Discharge: HOME | End: 2023-12-30
Payer: MEDICARE

## 2023-12-27 PROCEDURE — 97161 PT EVAL LOW COMPLEX 20 MIN: CPT

## 2023-12-27 PROCEDURE — 97110 THERAPEUTIC EXERCISES: CPT

## 2023-12-27 NOTE — CARE COORDINATION
Met with the patient to review and discuss plan of care and disposition needs s/p Right Total Knee Replacement scheduled for 1/9/24. Patient lives in his home with his wife who will be available to assist him as needed and to transport him to outpatient rehab. Instructed to bring loose fitting clothing, shoes with good support, etc. Patient did not have a preference regarding outpatient therapy agencies so [facility] Outpatient therapy was chosen especially for convenience in regards to location(close to patient's home). Appointment scheduled for January 11, 2024 at 2pm with arrival time of 1:45PM. Asked patient to stop by OP therapy to  paperwork. Appointment made by Colletta Leander. Will forward the patient's face sheet to [facility] OP therapy department. Front wheeled walker was given to the patient. Care Management Introductory letter with contact information given to the patient. Patient does not have a documented Advanced Directive on file. According to legal hierarchy patient's wife will be his Primary Healthcare Decision Maker. An Advanced Directive template along with the brochure: Your Right to Decide was given to the patient. ,

## 2023-12-27 NOTE — PERIOP NOTE
[facility]ALL[facility]  SURGICAL PRE-ADMISSION INSTRUCTIONS    ARRIVAL  You will be called the day before your surgery with your expected arrival time.  Sign in at the  of the hospital.  You will be directed to the Surgical Waiting Room.  Please arrive at your scheduled appointment time.  You have been scheduled to arrive for your procedure one or two hours prior to the expected start time of your procedure.  Every effort will be made to minimize your wait but please be aware that unforeseen circumstances may affect our schedule.    EATING  DO NOT EAT OR DRINK ANYTHING AFTER MIDNIGHT ON THE EVENING BEFORE YOUR SURGERY OR ON THE DAY OF YOUR SURGERY except for your medications (as instructed) with a sip of water.  Do not use gum, mints or lozenges on the morning of your surgery.  Please do not smoke or chew tobacco before your surgery.    MEDICATIONS   Take the following medications on the morning of your surgery with the smallest amount of water possible : None    STOP THESE MEDICATIONS AT THE TIMES LISTED BELOW  Aspirin ;  7 days before                                                NSAIDS (Indocin, Ibuprofen, Naprosyn) ;  7 days before     DRIVING/TRANSPORATION  Have a responsible adult to drive you home from the hospital and to stay with you over night.  Please have them plan to remain in the hospital during your surgery.  Your surgery will not be done if you do not have a responsible adult to take you home and to stay with you.  If you have arranged for public transport, you must have a responsible adult to ride with you (who is not the ).  You may not drive for 24 hours after anesthesia.    PREPARATION  If you have a Living WiIl/Advance Directive, please bring a copy with you to scan into your chart.   Please DO NOT wear makeup or nail polish  Please leave valuables at home,  DO NOT wear jewelry.  Wear loose, comfortable clothing that is large enough to cover a bulky

## 2023-12-28 ENCOUNTER — ANESTHESIA EVENT (OUTPATIENT)
Facility: HOSPITAL | Age: 68
End: 2023-12-28
Payer: MEDICARE

## 2023-12-28 NOTE — ANESTHESIA PRE PROCEDURE
Department of Anesthesiology  Preprocedure Note       Name:  Edward Ordonez   Age:  68 y.o.  :  1955                                          MRN:  [id number]         Date:  2023      Surgeon: Surgeon(s):  Maninder Osei MD    Procedure: Procedure(s):  TOTAL RIGHT KNEE REPLACEMENT    Medications prior to admission:   Prior to Admission medications    Medication Sig Start Date End Date Taking? Authorizing Provider   Ascorbic Acid (VITAMIN C) 1000 MG tablet Take 1 tablet by mouth daily    Provider, MD Shari   losartan (COZAAR) 100 MG tablet Take 1 tablet by mouth daily 23   Domo Max Jr., MD   aspirin 325 MG tablet Take 81 mg by mouth daily    Automatic Reconciliation, Ar       Current medications:    No current facility-administered medications for this encounter.     Current Outpatient Medications   Medication Sig Dispense Refill   • Ascorbic Acid (VITAMIN C) 1000 MG tablet Take 1 tablet by mouth daily     • losartan (COZAAR) 100 MG tablet Take 1 tablet by mouth daily 90 tablet 1   • aspirin 325 MG tablet Take 81 mg by mouth daily         Allergies:  No Known Allergies    Problem List:    Patient Active Problem List   Diagnosis Code   • Essential hypertension I10   • BPH without obstruction/lower urinary tract symptoms N40.0   • Hypercholesterolemia E78.00   • Encounter for hepatitis C screening test for low risk patient Z11.59   • Primary osteoarthritis of both knees M17.0       Past Medical History:        Diagnosis Date   • Class 1 obesity due to excess calories with serious comorbidity and body mass index (BMI) of 31.0 to 31.9 in adult 3/11/2020   • Hypertension        Past Surgical History:        Procedure Laterality Date   • COLONOSCOPY N/A 2021    COLONOSCOPY performed by Charo Loaiza MD at [facility] MAIN OR   • ORTHOPEDIC SURGERY     • ME UNLISTED PROCEDURE ABDOMEN PERITONEUM & OMENTUM  2009    part of intestines removed       Social History:    Social History

## 2024-01-06 PROBLEM — M17.11 PRIMARY OSTEOARTHRITIS OF RIGHT KNEE: Status: ACTIVE | Noted: 2024-01-06

## 2024-01-06 NOTE — H&P
Physical Exam:  General Appearance: alert and oriented to person, place and time, well-developed and well-nourished, in no acute distress  Pulmonary/Chest: clear to auscultation bilaterally- no wheezes, rales or rhonchi, normal air movement, no respiratory distress  Cardiovascular: normal rate, normal S1 and S2, no gallops, intact distal pulses, and no carotid bruits  Abdomen: soft, non-tender, non-distended, normal bowel sounds, no masses or organomegaly  Extremities: no cyanosis and no clubbing. 3+ DP and PT pulses  Musculoskeletal: normal range of motion, no joint swelling, deformity or tenderness, x b knees w/ varus deformity.  Neurologic: gait and coordination normal and speech normal  Xray right knee c/w end stage OA     Assessment:     Patient Active Problem List   Diagnosis    Essential hypertension    BPH without obstruction/lower urinary tract symptoms    Hypercholesterolemia    Encounter for hepatitis C screening test for low risk patient    Primary osteoarthritis of both knees    Primary osteoarthritis of right knee           Plan:     Right total knee arthroplasty.

## 2024-01-09 ENCOUNTER — APPOINTMENT (OUTPATIENT)
Facility: HOSPITAL | Age: 69
End: 2024-01-09
Attending: ORTHOPAEDIC SURGERY
Payer: MEDICARE

## 2024-01-09 ENCOUNTER — ANESTHESIA (OUTPATIENT)
Facility: HOSPITAL | Age: 69
End: 2024-01-09
Payer: MEDICARE

## 2024-01-09 ENCOUNTER — HOSPITAL ENCOUNTER (OUTPATIENT)
Facility: HOSPITAL | Age: 69
Setting detail: OBSERVATION
Discharge: HOME | End: 2024-01-10
Attending: ORTHOPAEDIC SURGERY | Admitting: ORTHOPAEDIC SURGERY
Payer: MEDICARE

## 2024-01-09 DIAGNOSIS — M17.0 PRIMARY OSTEOARTHRITIS OF BOTH KNEES: ICD-10-CM

## 2024-01-09 DIAGNOSIS — M17.11 PRIMARY OSTEOARTHRITIS OF RIGHT KNEE: Primary | ICD-10-CM

## 2024-01-09 PROCEDURE — G0378 HOSPITAL OBSERVATION PER HR: HCPCS

## 2024-01-09 PROCEDURE — 6360000002 HC RX W HCPCS: Performed by: ORTHOPAEDIC SURGERY

## 2024-01-09 PROCEDURE — 73560 X-RAY EXAM OF KNEE 1 OR 2: CPT

## 2024-01-09 PROCEDURE — 7100000001 HC PACU RECOVERY - ADDTL 15 MIN: Performed by: ORTHOPAEDIC SURGERY

## 2024-01-09 PROCEDURE — C1776 JOINT DEVICE (IMPLANTABLE): HCPCS | Performed by: ORTHOPAEDIC SURGERY

## 2024-01-09 PROCEDURE — 97165 OT EVAL LOW COMPLEX 30 MIN: CPT

## 2024-01-09 PROCEDURE — 3600000005 HC SURGERY LEVEL 5 BASE: Performed by: ORTHOPAEDIC SURGERY

## 2024-01-09 PROCEDURE — C1713 ANCHOR/SCREW BN/BN,TIS/BN: HCPCS | Performed by: ORTHOPAEDIC SURGERY

## 2024-01-09 PROCEDURE — 97161 PT EVAL LOW COMPLEX 20 MIN: CPT

## 2024-01-09 PROCEDURE — 64447 NJX AA&/STRD FEMORAL NRV IMG: CPT | Performed by: ANESTHESIOLOGY

## 2024-01-09 PROCEDURE — 2580000003 HC RX 258: Performed by: ORTHOPAEDIC SURGERY

## 2024-01-09 PROCEDURE — 97110 THERAPEUTIC EXERCISES: CPT

## 2024-01-09 PROCEDURE — 6370000000 HC RX 637 (ALT 250 FOR IP): Performed by: ORTHOPAEDIC SURGERY

## 2024-01-09 PROCEDURE — 6360000002 HC RX W HCPCS: Performed by: ANESTHESIOLOGY

## 2024-01-09 PROCEDURE — A4216 STERILE WATER/SALINE, 10 ML: HCPCS | Performed by: ORTHOPAEDIC SURGERY

## 2024-01-09 PROCEDURE — 3600000015 HC SURGERY LEVEL 5 ADDTL 15MIN: Performed by: ORTHOPAEDIC SURGERY

## 2024-01-09 PROCEDURE — 7100000000 HC PACU RECOVERY - FIRST 15 MIN: Performed by: ORTHOPAEDIC SURGERY

## 2024-01-09 PROCEDURE — 3700000001 HC ADD 15 MINUTES (ANESTHESIA): Performed by: ORTHOPAEDIC SURGERY

## 2024-01-09 PROCEDURE — 2500000003 HC RX 250 WO HCPCS: Performed by: ORTHOPAEDIC SURGERY

## 2024-01-09 PROCEDURE — 2720000010 HC SURG SUPPLY STERILE: Performed by: ORTHOPAEDIC SURGERY

## 2024-01-09 PROCEDURE — 2709999900 HC NON-CHARGEABLE SUPPLY: Performed by: ORTHOPAEDIC SURGERY

## 2024-01-09 PROCEDURE — 97535 SELF CARE MNGMENT TRAINING: CPT

## 2024-01-09 PROCEDURE — 3700000000 HC ANESTHESIA ATTENDED CARE: Performed by: ORTHOPAEDIC SURGERY

## 2024-01-09 DEVICE — P.F.C. SIGMA OVAL DOME PATELLA 3-PEG 38MM CEMENTED
Type: IMPLANTABLE DEVICE | Site: KNEE | Status: FUNCTIONAL
Brand: P.F.C. SIGMA

## 2024-01-09 DEVICE — P.F.C. SIGMA TIBIAL INSERT FIXED BEARING CURVED 4 (CVD) 10MM GVF
Type: IMPLANTABLE DEVICE | Site: KNEE | Status: FUNCTIONAL
Brand: P.F.C. SIGMA

## 2024-01-09 DEVICE — P.F.C. SIGMA MODULAR STEM CEMENTED 13MM X 30MM
Type: IMPLANTABLE DEVICE | Site: KNEE | Status: FUNCTIONAL
Brand: P.F.C. SIGMA

## 2024-01-09 DEVICE — SIGMA FEMORAL CRUCIATE RETAINING POROCOAT SIZE 4 RIGHT
Type: IMPLANTABLE DEVICE | Site: KNEE | Status: FUNCTIONAL
Brand: SIGMA

## 2024-01-09 DEVICE — TIBIAL TRAY ROTATING PLATFORM M.B.T. REVISION SIZE 4 CEMENTED: Type: IMPLANTABLE DEVICE | Site: KNEE | Status: FUNCTIONAL

## 2024-01-09 DEVICE — CEMENT BNE 40GM FULL DOSE PMMA W/ GENT HI VISC RADPQ LNG: Type: IMPLANTABLE DEVICE | Site: KNEE | Status: FUNCTIONAL

## 2024-01-09 RX ORDER — LOSARTAN POTASSIUM 100 MG/1
100 TABLET ORAL DAILY
Status: DISCONTINUED | OUTPATIENT
Start: 2024-01-09 | End: 2024-01-10 | Stop reason: HOSPADM

## 2024-01-09 RX ORDER — FENTANYL CITRATE 50 UG/ML
25 INJECTION, SOLUTION INTRAMUSCULAR; INTRAVENOUS EVERY 5 MIN PRN
Status: DISCONTINUED | OUTPATIENT
Start: 2024-01-09 | End: 2024-01-09 | Stop reason: HOSPADM

## 2024-01-09 RX ORDER — ONDANSETRON 2 MG/ML
4 INJECTION INTRAMUSCULAR; INTRAVENOUS EVERY 6 HOURS PRN
Status: DISCONTINUED | OUTPATIENT
Start: 2024-01-09 | End: 2024-01-10 | Stop reason: HOSPADM

## 2024-01-09 RX ORDER — SODIUM CHLORIDE 0.9 % (FLUSH) 0.9 %
5-40 SYRINGE (ML) INJECTION EVERY 12 HOURS SCHEDULED
Status: DISCONTINUED | OUTPATIENT
Start: 2024-01-09 | End: 2024-01-10 | Stop reason: HOSPADM

## 2024-01-09 RX ORDER — BUPIVACAINE HYDROCHLORIDE 5 MG/ML
INJECTION, SOLUTION EPIDURAL; INTRACAUDAL
Status: DISPENSED
Start: 2024-01-09 | End: 2024-01-09

## 2024-01-09 RX ORDER — HYDROCODONE BITARTRATE AND ACETAMINOPHEN 5; 325 MG/1; MG/1
1 TABLET ORAL EVERY 6 HOURS PRN
Status: DISCONTINUED | OUTPATIENT
Start: 2024-01-09 | End: 2024-01-10 | Stop reason: HOSPADM

## 2024-01-09 RX ORDER — PROPOFOL 10 MG/ML
INJECTION, EMULSION INTRAVENOUS CONTINUOUS PRN
Status: DISCONTINUED | OUTPATIENT
Start: 2024-01-09 | End: 2024-01-09 | Stop reason: SDUPTHER

## 2024-01-09 RX ORDER — KETOROLAC TROMETHAMINE 15 MG/ML
15 INJECTION, SOLUTION INTRAMUSCULAR; INTRAVENOUS ONCE
Status: COMPLETED | OUTPATIENT
Start: 2024-01-09 | End: 2024-01-09

## 2024-01-09 RX ORDER — FENTANYL CITRATE 50 UG/ML
INJECTION, SOLUTION INTRAMUSCULAR; INTRAVENOUS PRN
Status: DISCONTINUED | OUTPATIENT
Start: 2024-01-09 | End: 2024-01-09 | Stop reason: SDUPTHER

## 2024-01-09 RX ORDER — SODIUM CHLORIDE 9 MG/ML
INJECTION, SOLUTION INTRAVENOUS PRN
Status: DISCONTINUED | OUTPATIENT
Start: 2024-01-09 | End: 2024-01-09 | Stop reason: HOSPADM

## 2024-01-09 RX ORDER — SODIUM CHLORIDE 9 MG/ML
INJECTION, SOLUTION INTRAVENOUS PRN
Status: DISCONTINUED | OUTPATIENT
Start: 2024-01-09 | End: 2024-01-10 | Stop reason: HOSPADM

## 2024-01-09 RX ORDER — SODIUM CHLORIDE 0.9 % (FLUSH) 0.9 %
5-40 SYRINGE (ML) INJECTION PRN
Status: DISCONTINUED | OUTPATIENT
Start: 2024-01-09 | End: 2024-01-10 | Stop reason: HOSPADM

## 2024-01-09 RX ORDER — POTASSIUM CHLORIDE 7.45 MG/ML
10 INJECTION INTRAVENOUS PRN
Status: DISCONTINUED | OUTPATIENT
Start: 2024-01-09 | End: 2024-01-10 | Stop reason: HOSPADM

## 2024-01-09 RX ORDER — SODIUM CHLORIDE 0.9 % (FLUSH) 0.9 %
5-40 SYRINGE (ML) INJECTION PRN
Status: DISCONTINUED | OUTPATIENT
Start: 2024-01-09 | End: 2024-01-09 | Stop reason: HOSPADM

## 2024-01-09 RX ORDER — SODIUM CHLORIDE, SODIUM LACTATE, POTASSIUM CHLORIDE, CALCIUM CHLORIDE 600; 310; 30; 20 MG/100ML; MG/100ML; MG/100ML; MG/100ML
INJECTION, SOLUTION INTRAVENOUS CONTINUOUS
Status: DISCONTINUED | OUTPATIENT
Start: 2024-01-09 | End: 2024-01-09

## 2024-01-09 RX ORDER — CELECOXIB 100 MG/1
200 CAPSULE ORAL ONCE
Status: COMPLETED | OUTPATIENT
Start: 2024-01-09 | End: 2024-01-09

## 2024-01-09 RX ORDER — POTASSIUM CHLORIDE 750 MG/1
40 TABLET, FILM COATED, EXTENDED RELEASE ORAL PRN
Status: DISCONTINUED | OUTPATIENT
Start: 2024-01-09 | End: 2024-01-10 | Stop reason: HOSPADM

## 2024-01-09 RX ORDER — SODIUM CHLORIDE 0.9 % (FLUSH) 0.9 %
5-40 SYRINGE (ML) INJECTION EVERY 12 HOURS SCHEDULED
Status: DISCONTINUED | OUTPATIENT
Start: 2024-01-09 | End: 2024-01-09 | Stop reason: HOSPADM

## 2024-01-09 RX ORDER — POLYETHYLENE GLYCOL 3350 17 G/17G
17 POWDER, FOR SOLUTION ORAL DAILY PRN
Status: DISCONTINUED | OUTPATIENT
Start: 2024-01-09 | End: 2024-01-10 | Stop reason: HOSPADM

## 2024-01-09 RX ORDER — MIDAZOLAM HYDROCHLORIDE 1 MG/ML
INJECTION INTRAMUSCULAR; INTRAVENOUS PRN
Status: DISCONTINUED | OUTPATIENT
Start: 2024-01-09 | End: 2024-01-09 | Stop reason: SDUPTHER

## 2024-01-09 RX ORDER — PROMETHAZINE HYDROCHLORIDE 25 MG/1
12.5 TABLET ORAL EVERY 6 HOURS PRN
Status: DISCONTINUED | OUTPATIENT
Start: 2024-01-09 | End: 2024-01-10 | Stop reason: HOSPADM

## 2024-01-09 RX ORDER — ACETAMINOPHEN 650 MG/1
650 SUPPOSITORY RECTAL EVERY 6 HOURS PRN
Status: DISCONTINUED | OUTPATIENT
Start: 2024-01-09 | End: 2024-01-10 | Stop reason: HOSPADM

## 2024-01-09 RX ORDER — ACETAMINOPHEN 325 MG/1
650 TABLET ORAL EVERY 6 HOURS PRN
Status: DISCONTINUED | OUTPATIENT
Start: 2024-01-09 | End: 2024-01-10 | Stop reason: HOSPADM

## 2024-01-09 RX ORDER — ASPIRIN 81 MG/1
81 TABLET, CHEWABLE ORAL 2 TIMES DAILY
Status: DISCONTINUED | OUTPATIENT
Start: 2024-01-09 | End: 2024-01-10 | Stop reason: HOSPADM

## 2024-01-09 RX ORDER — DEXTROSE AND SODIUM CHLORIDE 5; .45 G/100ML; G/100ML
INJECTION, SOLUTION INTRAVENOUS CONTINUOUS
Status: DISCONTINUED | OUTPATIENT
Start: 2024-01-09 | End: 2024-01-09

## 2024-01-09 RX ORDER — MAGNESIUM SULFATE IN WATER 40 MG/ML
2000 INJECTION, SOLUTION INTRAVENOUS PRN
Status: DISCONTINUED | OUTPATIENT
Start: 2024-01-09 | End: 2024-01-10 | Stop reason: HOSPADM

## 2024-01-09 RX ORDER — GABAPENTIN 100 MG/1
100 CAPSULE ORAL ONCE
Status: COMPLETED | OUTPATIENT
Start: 2024-01-09 | End: 2024-01-09

## 2024-01-09 RX ORDER — BUPIVACAINE HYDROCHLORIDE 7.5 MG/ML
INJECTION, SOLUTION INTRASPINAL
Status: COMPLETED | OUTPATIENT
Start: 2024-01-09 | End: 2024-01-09

## 2024-01-09 RX ORDER — ONDANSETRON 2 MG/ML
4 INJECTION INTRAMUSCULAR; INTRAVENOUS
Status: DISCONTINUED | OUTPATIENT
Start: 2024-01-09 | End: 2024-01-09 | Stop reason: HOSPADM

## 2024-01-09 RX ADMIN — GABAPENTIN 100 MG: 100 CAPSULE ORAL at 07:08

## 2024-01-09 RX ADMIN — KETOROLAC TROMETHAMINE: 30 INJECTION, SOLUTION INTRAMUSCULAR; INTRAVENOUS at 08:33

## 2024-01-09 RX ADMIN — SODIUM CHLORIDE, PRESERVATIVE FREE 10 ML: 5 INJECTION INTRAVENOUS at 20:01

## 2024-01-09 RX ADMIN — BUPIVACAINE HYDROCHLORIDE IN DEXTROSE 13.5 MG: 7.5 INJECTION, SOLUTION SUBARACHNOID at 07:48

## 2024-01-09 RX ADMIN — CELECOXIB 200 MG: 100 CAPSULE ORAL at 07:07

## 2024-01-09 RX ADMIN — LOSARTAN POTASSIUM 100 MG: 100 TABLET, FILM COATED ORAL at 12:23

## 2024-01-09 RX ADMIN — HYDROCODONE BITARTRATE AND ACETAMINOPHEN 1 TABLET: 5; 325 TABLET ORAL at 19:59

## 2024-01-09 RX ADMIN — MIDAZOLAM HYDROCHLORIDE 2 MG: 1 INJECTION, SOLUTION INTRAMUSCULAR; INTRAVENOUS at 07:46

## 2024-01-09 RX ADMIN — ASPIRIN 81 MG: 81 TABLET, CHEWABLE ORAL at 20:00

## 2024-01-09 RX ADMIN — KETOROLAC TROMETHAMINE 15 MG: 15 INJECTION, SOLUTION INTRAMUSCULAR; INTRAVENOUS at 16:28

## 2024-01-09 RX ADMIN — CEFAZOLIN 2000 MG: 2 INJECTION, POWDER, FOR SOLUTION INTRAMUSCULAR; INTRAVENOUS at 08:01

## 2024-01-09 RX ADMIN — SODIUM CHLORIDE, POTASSIUM CHLORIDE, SODIUM LACTATE AND CALCIUM CHLORIDE: 600; 310; 30; 20 INJECTION, SOLUTION INTRAVENOUS at 07:33

## 2024-01-09 RX ADMIN — FENTANYL CITRATE 50 MCG: 50 INJECTION, SOLUTION INTRAMUSCULAR; INTRAVENOUS at 08:05

## 2024-01-09 RX ADMIN — DEXTROSE AND SODIUM CHLORIDE: 5; 450 INJECTION, SOLUTION INTRAVENOUS at 12:26

## 2024-01-09 RX ADMIN — FENTANYL CITRATE 50 MCG: 50 INJECTION, SOLUTION INTRAMUSCULAR; INTRAVENOUS at 07:49

## 2024-01-09 RX ADMIN — TRANEXAMIC ACID 1000 MG: 1 INJECTION, SOLUTION INTRAVENOUS at 08:17

## 2024-01-09 RX ADMIN — PROPOFOL 77 MCG/KG/MIN: 10 INJECTION, EMULSION INTRAVENOUS at 08:14

## 2024-01-09 RX ADMIN — HYDROCODONE BITARTRATE AND ACETAMINOPHEN 1 TABLET: 5; 325 TABLET ORAL at 14:28

## 2024-01-09 RX ADMIN — TRANEXAMIC ACID 1000 MG: 1 INJECTION, SOLUTION INTRAVENOUS at 10:19

## 2024-01-09 RX ADMIN — CEFAZOLIN 2000 MG: 2 INJECTION, POWDER, FOR SOLUTION INTRAMUSCULAR; INTRAVENOUS at 14:37

## 2024-01-09 ASSESSMENT — PAIN DESCRIPTION - DESCRIPTORS
DESCRIPTORS: ACHING

## 2024-01-09 ASSESSMENT — PAIN DESCRIPTION - ORIENTATION
ORIENTATION: RIGHT

## 2024-01-09 ASSESSMENT — PAIN DESCRIPTION - LOCATION
LOCATION: KNEE

## 2024-01-09 ASSESSMENT — PAIN - FUNCTIONAL ASSESSMENT
PAIN_FUNCTIONAL_ASSESSMENT: 0-10
PAIN_FUNCTIONAL_ASSESSMENT: PREVENTS OR INTERFERES SOME ACTIVE ACTIVITIES AND ADLS

## 2024-01-09 ASSESSMENT — PAIN SCALES - GENERAL
PAINLEVEL_OUTOF10: 5
PAINLEVEL_OUTOF10: 5
PAINLEVEL_OUTOF10: 0
PAINLEVEL_OUTOF10: 5

## 2024-01-09 NOTE — OP NOTE
1/9/2024  OPERATIVE REPORT      PREOPERATIVE DIAGNOSIS: Osteoarthritis, right knee.    POSTOPERATIVE DIAGNOSIS: Osteoarthritis, right knee.    OPERATIVE PROCEDURE: right total knee replacement.    SURGEON: Maninder Osei MD    ASSISTANT SURGEON: Shashi    ANESTHESIA: Spinal/regional    INDICATIONS: : A 68 y.o.  male  with end stage osteoarthritis to the right knee, not responsive to conservative management.      COMPLICATIONS:None    PROCEDURE: The patient was taken to the operating room, underwent  placement of anesthesia. The knee and leg were prepped and  draped in the usual fashion. A longitudinal midline  incision made down through skin and subcutaneous tissue. A medial  parapatellar arthrotomy was performed, and extended proximally along the  medial border of the quadriceps tendon, distally along the medial border of  the insertion of the patella tendon. Medial release was performed.  Retropatellar fat pad was sharply excised. The patella was subluxated  laterally, the knee was flexed to 90 degrees. Drill was used to enter the  intramedullary canal of the distal femur. The 5 degree intramedullary guide  was applied and the distal femoral cut was performed. The femur was sized  to a 4. A 4 cutting block was applied, and the anterior, posterior,  chamfer cuts were performed. The extramedullary tibial guide was applied, and the tibia was cut in neutral alignment. The tibia was sized to a 4. Knee was balanced to varus  and valgus stress. Flexion and extension gaps were equal. Trial reduction  was performed, using 10 mm insert.  Excellent range of motion and stability  were noted. The patella was everted, and the patella was cut using   Patella cutting guide. Patella was sized to a 38. Drill holes were placed, and patella  button applied. The patella tracked normally. All trial components were  removed, and surfaces were prepared using drill and punch. All residual  meniscal tissue was sharply excised.

## 2024-01-09 NOTE — ANESTHESIA POSTPROCEDURE EVALUATION
Department of Anesthesiology  Postprocedure Note    Patient: Edward Ordonez  MRN: [id number]  YOB: 1955  Date of evaluation: 1/9/2024    Procedure Summary       Date: 01/09/24 Room / Location: [facility] MAIN OR 2 / [facility] MAIN OR    Anesthesia Start: 0746 Anesthesia Stop: 1042    Procedure: RIGHT TOTAL RIGHT KNEE REPLACEMENT (Right: Knee) Diagnosis:       Primary osteoarthritis of right knee      (Primary osteoarthritis of right knee [M17.11])    Surgeons: Maninder Osei MD Responsible Provider: Ayan Baum MD    Anesthesia Type: spinal, regional ASA Status: 2            Anesthesia Type: No value filed.    Kaya Phase I: Kaya Score: 9    Kaya Phase II:      Anesthesia Post Evaluation    Patient location during evaluation: bedside  Patient participation: complete - patient participated  Level of consciousness: awake and alert  Pain score: 0  Airway patency: patent  Nausea & Vomiting: no nausea  Cardiovascular status: hemodynamically stable  Respiratory status: acceptable  Hydration status: stable  Pain management: adequate    No notable events documented.

## 2024-01-09 NOTE — CARE COORDINATION
Care Management Initial Assessment       RUR:n/a obs   Readmission? No  1st IM letter given? No  1st  letter given: No     01/09/24 1129   Service Assessment   Patient Orientation Alert and Oriented   Cognition Alert   History Provided By Patient   Primary Caregiver Self   Support Systems Spouse/Significant Other   Patient's Healthcare Decision Maker is: Named in Scanned ACP Document   PCP Verified by CM Yes  (Domo Max MD)   Last Visit to PCP Within last 3 months   Prior Functional Level Independent in ADLs/IADLs   Current Functional Level Assistance with the following:;Mobility   Can patient return to prior living arrangement Yes   Ability to make needs known: Good   Family able to assist with home care needs: Yes   Would you like for me to discuss the discharge plan with any other family members/significant others, and if so, who? Yes  (Spouse)   Financial Resources Medicare   Social/Functional History   Lives With Spouse   Type of Home House   Home Equipment Walker, standard   Active  Yes   Discharge Planning   Type of Residence Other (Comment)  (Outpatient PT at [facility])   Current Services Prior To Admission None   Patient expects to be discharged to: House         Saurav Cook's note completed on pre-op visit below    \"Met with the patient to review and discuss plan of care and disposition needs s/p Right Total Knee Replacement scheduled for 1/9/24. Patient lives in his home with his wife who will be available to assist him as needed and to transport him to outpatient rehab. Instructed to bring loose fitting clothing, shoes with good support, etc. Patient did not have a preference regarding outpatient therapy agencies so [facility] Outpatient therapy was chosen especially for convenience in regards to location(close to patient's home). Appointment scheduled for January 11, 2024 at 2pm with arrival time of 1:45PM. Asked patient to stop by OP therapy to  paperwork. Appointment made by Nneka. Ej

## 2024-01-09 NOTE — PROGRESS NOTES
Postop check:    Subjective: He is reporting moderate amount of discomfort.  The block seems to be wearing off to some degree.  He describes more discomfort along the lateral aspect of the knee.    Objective: He is afebrile and his vital signs are stable.  He has intact neurovascular function into the foot with good movement of the toes good sensation in the toes and he has 3+ dorsalis pedis and posterior tibial pulses.  His dressing is intact and dry.  He has excellent alignment.  Postoperative x-rays demonstrate excellent position of the total knee components.    Assessment: Satisfactory postoperative recovery right total knee arthroplasty.    Plan: I mentioned to the patient that if he did well he can go home.  He really wants to go home tonight however we will see how he does with physical therapy.  There is a possibility but I would like to see him independent and stable prior to discharge home.  I have made some adjustments in his pain meds.  Will get a give him IV Toradol for now to see if this will help with physical therapy and then he will have ibuprofen every 6 hours as needed for pain combined with Tylenol.  I discussed this with the patient and his wife.  Will see how he does with physical therapy and possible discharge home today or tomorrow.

## 2024-01-09 NOTE — ANESTHESIA PROCEDURE NOTES
Peripheral Block    Patient location during procedure: PACU  Reason for block: post-op pain management and at surgeon's request  Start time: 1/9/2024 7:55 AM  End time: 1/9/2024 8:00 AM  Staffing  Performed by: Ayan Baum MD  Authorized by: Ayan Baum MD    Preanesthetic Checklist  Completed: patient identified, IV checked, site marked, risks and benefits discussed, surgical/procedural consents, equipment checked, pre-op evaluation, timeout performed, anesthesia consent given, oxygen available, monitors applied/VS acknowledged, fire risk safety assessment completed and verbalized and blood product R/B/A discussed and consented  Peripheral Block   Patient position: supine  Prep: ChloraPrep  Patient monitoring: continuous pulse ox, continuous capnometry, cardiac monitor, frequent blood pressure checks, IV access, oxygen and responsive to questions  Block type: Saphenous  Laterality: right  Injection technique: single-shot  Guidance: ultrasound guided  Local infiltration: bupivacaine  Local infiltration: bupivacaine    Needle   Needle type: insulated echogenic nerve stimulator needle   Needle gauge: 21 G  Needle localization: ultrasound guidance  Needle insertion depth: 4 cm  Test dose: negative  Needle length: 10 cm  Assessment   Injection assessment: negative aspiration for heme, no paresthesia on injection, local visualized surrounding nerve on ultrasound and no intravascular symptoms  Paresthesia pain: none  Slow fractionated injection: yes  Hemodynamics: stable  Real-time US image taken/store: yes  Outcomes: patient tolerated procedure well and uncomplicated    Additional Notes  30cc 0.5% bupivacaine

## 2024-01-09 NOTE — ANESTHESIA PROCEDURE NOTES
Spinal Block    End time: 1/9/2024 7:53 AM  Reason for block: primary anesthetic  Staffing  Performed by: yAan Baum MD  Authorized by: Ayan Baum MD    Spinal Block  Patient position: sitting  Prep: ChloraPrep  Patient monitoring: cardiac monitor, continuous pulse ox, continuous capnometry, frequent blood pressure checks and oxygen  Approach: right paramedian  Location: L4/L5  Needle  Needle type: Quincke   Needle gauge: 25 G  Needle length: 3.5 in  Assessment  Swirl obtained: Yes  Attempts: 2  Additional Notes  25g quincke redirected once to the right- clear csf  Preanesthetic Checklist  Completed: patient identified, IV checked, site marked, risks and benefits discussed, surgical/procedural consents, equipment checked, pre-op evaluation, timeout performed, anesthesia consent given, oxygen available, monitors applied/VS acknowledged, fire risk safety assessment completed and verbalized and blood product R/B/A discussed and consented

## 2024-01-09 NOTE — PROGRESS NOTES
TRANSFER - OUT REPORT:    Verbal report given to Britany MAN LPN on Edward Ordonez  being transferred to /S for routine progression of patient care       Report consisted of patient's Situation, Background, Assessment and   Recommendations(SBAR).     Information from the following report(s) Surgery Report and MAR was reviewed with the receiving nurse.           Lines:   Peripheral IV 01/09/24 Posterior;Right Forearm (Active)   Site Assessment Clean, dry & intact 01/09/24 0828   Line Status Infusing 01/09/24 0828   Line Care Connections checked and tightened 01/09/24 0733   Phlebitis Assessment No symptoms 01/09/24 0828   Infiltration Assessment 0 01/09/24 0828   Alcohol Cap Used No 01/09/24 0733   Dressing Status Clean, dry & intact 01/09/24 0828   Dressing Type Transparent 01/09/24 0828   Dressing Intervention New 01/09/24 0733        Opportunity for questions and clarification was provided.      Patient transported with:  Registered Nurse

## 2024-01-10 ENCOUNTER — TELEPHONE (OUTPATIENT)
Age: 69
End: 2024-01-10

## 2024-01-10 VITALS
WEIGHT: 200 LBS | DIASTOLIC BLOOD PRESSURE: 70 MMHG | HEART RATE: 89 BPM | HEIGHT: 70 IN | OXYGEN SATURATION: 97 % | SYSTOLIC BLOOD PRESSURE: 154 MMHG | RESPIRATION RATE: 19 BRPM | TEMPERATURE: 97.7 F | BODY MASS INDEX: 28.63 KG/M2

## 2024-01-10 PROBLEM — M17.11 PRIMARY OSTEOARTHRITIS OF RIGHT KNEE: Status: RESOLVED | Noted: 2024-01-06 | Resolved: 2024-01-10

## 2024-01-10 PROCEDURE — G0378 HOSPITAL OBSERVATION PER HR: HCPCS

## 2024-01-10 PROCEDURE — 97535 SELF CARE MNGMENT TRAINING: CPT

## 2024-01-10 PROCEDURE — 2580000003 HC RX 258: Performed by: ORTHOPAEDIC SURGERY

## 2024-01-10 PROCEDURE — 94760 N-INVAS EAR/PLS OXIMETRY 1: CPT

## 2024-01-10 PROCEDURE — 36415 COLL VENOUS BLD VENIPUNCTURE: CPT

## 2024-01-10 PROCEDURE — 6370000000 HC RX 637 (ALT 250 FOR IP): Performed by: ORTHOPAEDIC SURGERY

## 2024-01-10 PROCEDURE — 96374 THER/PROPH/DIAG INJ IV PUSH: CPT

## 2024-01-10 PROCEDURE — 6360000002 HC RX W HCPCS: Performed by: ORTHOPAEDIC SURGERY

## 2024-01-10 PROCEDURE — 97110 THERAPEUTIC EXERCISES: CPT

## 2024-01-10 PROCEDURE — 80299 QUANTITATIVE ASSAY DRUG: CPT

## 2024-01-10 RX ORDER — HYDROCODONE BITARTRATE AND ACETAMINOPHEN 5; 325 MG/1; MG/1
1 TABLET ORAL EVERY 6 HOURS PRN
Qty: 20 TABLET | Refills: 0 | Status: SHIPPED | OUTPATIENT
Start: 2024-01-10 | End: 2024-01-13

## 2024-01-10 RX ORDER — ASPIRIN 81 MG/1
81 TABLET, CHEWABLE ORAL 2 TIMES DAILY
Qty: 30 TABLET | Refills: 3 | Status: SHIPPED | OUTPATIENT
Start: 2024-01-10

## 2024-01-10 RX ORDER — IBUPROFEN 800 MG/1
800 TABLET ORAL EVERY 6 HOURS PRN
Qty: 120 TABLET | Refills: 3 | Status: SHIPPED | OUTPATIENT
Start: 2024-01-10

## 2024-01-10 RX ADMIN — LOSARTAN POTASSIUM 100 MG: 100 TABLET, FILM COATED ORAL at 09:08

## 2024-01-10 RX ADMIN — HYDROCODONE BITARTRATE AND ACETAMINOPHEN 1 TABLET: 5; 325 TABLET ORAL at 01:35

## 2024-01-10 RX ADMIN — HYDROMORPHONE HYDROCHLORIDE 1 MG: 1 INJECTION, SOLUTION INTRAMUSCULAR; INTRAVENOUS; SUBCUTANEOUS at 10:23

## 2024-01-10 RX ADMIN — SODIUM CHLORIDE, PRESERVATIVE FREE 10 ML: 5 INJECTION INTRAVENOUS at 09:08

## 2024-01-10 RX ADMIN — HYDROCODONE BITARTRATE AND ACETAMINOPHEN 1 TABLET: 5; 325 TABLET ORAL at 09:08

## 2024-01-10 RX ADMIN — IBUPROFEN 800 MG: 200 TABLET, FILM COATED ORAL at 00:24

## 2024-01-10 RX ADMIN — ASPIRIN 81 MG: 81 TABLET, CHEWABLE ORAL at 09:08

## 2024-01-10 ASSESSMENT — PAIN SCALES - GENERAL
PAINLEVEL_OUTOF10: 5
PAINLEVEL_OUTOF10: 7
PAINLEVEL_OUTOF10: 6
PAINLEVEL_OUTOF10: 5

## 2024-01-10 ASSESSMENT — PAIN DESCRIPTION - ORIENTATION
ORIENTATION: RIGHT
ORIENTATION: RIGHT

## 2024-01-10 ASSESSMENT — PAIN DESCRIPTION - DESCRIPTORS: DESCRIPTORS: ACHING

## 2024-01-10 ASSESSMENT — PAIN DESCRIPTION - LOCATION
LOCATION: KNEE

## 2024-01-10 ASSESSMENT — PAIN - FUNCTIONAL ASSESSMENT
PAIN_FUNCTIONAL_ASSESSMENT: PREVENTS OR INTERFERES SOME ACTIVE ACTIVITIES AND ADLS
PAIN_FUNCTIONAL_ASSESSMENT: PREVENTS OR INTERFERES SOME ACTIVE ACTIVITIES AND ADLS

## 2024-01-10 ASSESSMENT — PAIN SCALES - WONG BAKER: WONGBAKER_NUMERICALRESPONSE: 0

## 2024-01-10 NOTE — DISCHARGE INSTRUCTIONS
2 weeks if it remains intact and not causing irritation.  You may shower with the dressing in place.  After 7 days, if the dressing has been removed, you may shower and get your incision wet but do not submerge your incision under water in a bath tub, hot tub or swimming pool for 6 weeks after surgery.    Preventing blood clots  Take Aspirin 81 mg. twice a day as a blood thinner    Pain management  Take pain medication as prescribed; decrease the amount you use as your pain lessens  Avoid alcoholic beverages while taking pain medication  Please be aware that many medications contain Tylenol.  We do not want you to over medicate so please read the information below as a guide.  Do not take more than 4 Grams of Tylenol in a 24 hour period.  (There are 1000 milligrams in one Gram)  Percocet contains 325 mg of Tylenol per tablet (do not take more than 12 tablets in 24 hours)  Norco contains 325 mg of Tylenol per tablet (do not take more than 12 tablets in 24 hours).  You may place an ice bag on your knee for 15-20 minutes after exercising  May use ibuprofen 800 mg combined with Tylenol 650 mg every 6 hours as needed for pain.    Diet  Resume usual diet; drink plenty of fluids; eat foods high in fiber  You may want to take a stool softener (such as Senokot-S or Colace) to prevent constipation while you are taking pain medication.  If constipation occurs, take a laxative (such as Dulcolax tablets, Milk of Magnesia, or a suppository)    Home Health Care Protocol (to be followed by Home Health Agency)  Nursing-per physicians order  Complete head to toe assessment, vital signs  Staples will be removed in the surgeon’s office at 2 week visit  Medication reconciliation  Review pain management  Manage chronic medical conditions    Physical Therapy-per physicians order  Weight bearing status:           ?  Mobility Status:              Gait:              ADL status overall composite:                Physical Therapy  Assessment

## 2024-01-10 NOTE — TELEPHONE ENCOUNTER
Pt is being released today from [facility]. He has a KENA appointment with Dr. Griffin over in [place] on 01/16/24 @ 1:00 PM.

## 2024-01-10 NOTE — PROGRESS NOTES
Discharge plan of care/case management plan validated with provider discharge order. Discussed with the patient and all questioned fully answered. He will call me if any problems arise.

## 2024-01-10 NOTE — PLAN OF CARE
Problem: Occupational Therapy - Adult  Goal: By Discharge: Performs self-care activities at highest level of function for planned discharge setting.  See evaluation for individualized goals.  Description: FUNCTIONAL STATUS PRIOR TO ADMISSION:  Patient was ambulatory using no DME, reporting [place] with ADLs/IADLs   , ADL Assistance: Independent,  ,  ,  ,  ,  , Homemaking Assistance: Independent,  ,  , Active : Yes     HOME SUPPORT: Patient lived with wife but didn't require assistance.    Occupational Therapy Goals:  Initiated 1/9/2024  1.  Patient will perform RW grooming with Supervision within 7 day(s).  2.  Patient will perform RW lower body dressing with Supervision within 7 day(s).  3.  Patient will perform bathing with Supervision within 7 day(s).  4.  Patient will perform RW toilet transfers with Supervision  within 7 day(s).  5.  Patient will perform all aspects of RW toileting with Supervision within 7 day(s).    Outcome: Progressing     OCCUPATIONAL THERAPY TREATMENT  Patient: Edward Ordonez (68 y.o. male)  Date: 1/10/2024  Primary Diagnosis: Primary osteoarthritis of right knee [M17.11]  Procedure(s) (LRB):  RIGHT TOTAL RIGHT KNEE REPLACEMENT (Right) 1 Day Post-Op   Precautions: Weight Bearing, Fall Risk, General Precautions Right Lower Extremity Weight Bearing: Weight Bearing As Tolerated              Chart, occupational therapy assessment, plan of care, and goals were reviewed.    ASSESSMENT  Patient continues to benefit from skilled OT services and is progressing towards goals. Despite 8/10 c/o pain in R knee, pt noted with progressive ADL independence, with good awareness to modified dressing techniques, as well as, good awareness to avoid R knee rotation when completing LE bathing/dressing.  Pt's wife present and reports good ability to assist pt with ADL needs within home.  Pt safe for discharge home from a self-care standpoint, with acute OT to continue to follow in the event of 
  Problem: Pain  Goal: Verbalizes/displays adequate comfort level or baseline comfort level  Outcome: Progressing     
  Problem: Pain  Goal: Verbalizes/displays adequate comfort level or baseline comfort level  Outcome: Progressing     Problem: ABCDS Injury Assessment  Goal: Absence of physical injury  Outcome: Progressing     Problem: Safety - Adult  Goal: Free from fall injury  Outcome: Progressing     
  Problem: Physical Therapy - Adult  Goal: By Discharge: Performs mobility at highest level of function for planned discharge setting.  See evaluation for individualized goals.  Description: FUNCTIONAL STATUS PRIOR TO ADMISSION: Patient was independent and active without use of DME.    HOME SUPPORT PRIOR TO ADMISSION: The patient lived with spouse but did not require assistance.    Physical Therapy Goals  Initiated 1/9/2024  1.  Patient will move from supine to sit and sit to supine in bed with modified independence within 4 day(s).    2.  Patient will perform sit to stand with SBA within 4 day(s).  3.  Patient will transfer from bed to chair and chair to bed with SBA using the least restrictive device within 4 day(s).  4.  Patient will ambulate with SBA for 200 feet with the least restrictive device within 4 day(s).   5.  Patient will ascend/descend 4 stairs with 1-2 handrail(s) with SBA within 4 day(s).  6. Patient will perform R TKR home exercise program per protocol with modified independence within 4 days.  7. Patient will demonstrate AROM 0-90 degrees in operative joint within 4 days.     Outcome: Adequate for Discharge   PHYSICAL THERAPY TREATMENT/DISCHARGE    Patient: Edward Ordonez (68 y.o. male)  Date: 1/10/2024  Diagnosis: Primary osteoarthritis of right knee [M17.11] Primary osteoarthritis of right knee  Procedure(s) (LRB):  RIGHT TOTAL RIGHT KNEE REPLACEMENT (Right) 1 Day Post-Op  Precautions: Weight Bearing, Fall Risk, General Precautions Right Lower Extremity Weight Bearing: Weight Bearing As Tolerated                  ASSESSMENT:  Patient has been followed by skilled PT services and has progressed towards goals. Pt presented sitting up in recliner chair; spouse present. Vitals taken as noted per flowsheet. Pt was then guided through a few supine therex to promote increased AROM and strength of R LE prior to getting up; details noted below. Pt was then able to slowly and carefully walk all the way 
razor)  Dressing: Needs help, but does at least half of task within reasonable time  Bowel Control: No accidents. Able to use enema or suppository if needed  Bladder Control: No accidents. Able to care for collecting device, if used  Toilet Transfers: Needs help for balance, handling clothes or toilet paper  Chair/Bed Trannsfers: Minimum assistance or supervision required  Ambulation: Cannot perform activity  Stairs: Cannot perform activity  Total Barthel Index Score: 55       The Barthel ADL Index: Guidelines  1. The index should be used as a record of what a patient does, not as a record of what a patient could do.  2. The main aim is to establish degree of independence from any help, physical or verbal, however minor and for whatever reason.  3. The need for supervision renders the patient not independent.  4. A patient's performance should be established using the best available evidence. Asking the patient, friends/relatives and nurses are the usual sources, but direct observation and common sense are also important. However direct testing is not needed.  5. Usually the patient's performance over the preceding 24-48 hours is important, but occasionally longer periods will be relevant.  6. Middle categories imply that the patient supplies over 50 per cent of the effort.  7. Use of aids to be independent is allowed.    Score Interpretation (from Dao 1997)    Independent   60-79 Minimally independent   40-59 Partially dependent   20-39 Very dependent   <20 Totally dependent     -Yazmin Deshpande., Barthel, DDariusW. (1965). Functional evaluation: the Barthel Index. [facility] Med J 142.  -ИРИНА Dc, LATIA Atkins (1997). The Barthel activities of daily living index: self-reporting versus actual performance in the old (> or = 75 years). Journal of American Geriatric Society 45(7), 832-836.   -KATE Saba.F, ANGLE Soto., [place], JDariusA., Christopher, A.J. (1999). Measuring the change in disability after inpatient 
to use enema or suppository if needed  Bladder Control: No accidents. Able to care for collecting device, if used  Toilet Transfers: Needs help for balance, handling clothes or toilet paper  Chair/Bed Trannsfers: Minimum assistance or supervision required  Ambulation: Cannot perform activity  Stairs: Cannot perform activity  Total Barthel Index Score: 55       The Barthel ADL Index: Guidelines  1. The index should be used as a record of what a patient does, not as a record of what a patient could do.  2. The main aim is to establish degree of independence from any help, physical or verbal, however minor and for whatever reason.  3. The need for supervision renders the patient not independent.  4. A patient's performance should be established using the best available evidence. Asking the patient, friends/relatives and nurses are the usual sources, but direct observation and common sense are also important. However direct testing is not needed.  5. Usually the patient's performance over the preceding 24-48 hours is important, but occasionally longer periods will be relevant.  6. Middle categories imply that the patient supplies over 50 per cent of the effort.  7. Use of aids to be independent is allowed.    Score Interpretation (from Dao 1997)    Independent   60-79 Minimally independent   40-59 Partially dependent   20-39 Very dependent   <20 Totally dependent     -Yazmin Deshpande., Barthel, D.W. (1965). Functional evaluation: the Barthel Index. [place] Med J 142.  -ИРИНА Dc, LATIA Atkins (1997). The Barthel activities of daily living index: self-reporting versus actual performance in the old (> or = 75 years). Journal of American Geriatric Society 45(7), 832-836.   -REG SabaF, ANGLE Soto., ERIN Wolff., White, A.J. (1999). Measuring the change in disability after inpatient rehabilitation; comparison of the responsiveness of the Barthel Index and Functional [place] Measure. Journal of

## 2024-01-10 NOTE — CARE COORDINATION
01/10/24 1239   Services At/After Discharge   Transition of Care Consult (CM Consult) Other  (OP pT)   Services At/After Discharge PT   [place] Resource Information Provided? No   Mode of Transport at Discharge Self   Confirm Follow Up Transport Self   Condition of Participation: Discharge Planning   The Plan for Transition of Care is related to the following treatment goals: OP PT at [facility]   The Patient and/or Patient Representative was provided with a Choice of Provider? Patient   The Patient and/Or Patient Representative agree with the Discharge Plan? Yes   Freedom of Choice list was provided with basic dialogue that supports the patient's individualized plan of care/goals, treatment preferences, and shares the quality data associated with the providers?  Yes         Mr. Ordonez was discharged today. He choose to go with outpatient PT at [facility]. He has an appointment there tomorrow 1/11. Mr. Ordonez is aware and agrees with dc plans. Told him to contact CM for any questions or concerns during/after his stay.     Transition of Care Plan:    RUR: n/a obs   Prior Level of Functioning: Independent   Disposition: Home w/OP PT   If SNF or IPR: Date FOC offered:   Date FOC received:   Accepting facility:   Date authorization started with reference number:   Date authorization received and expires:   Follow up appointments:    1. OP PT at [facility] 1/11 at 2pm   2. Maninder Osei MD 1/22 at 11am.    3. Domo Max MD 1/16 at 11am   DME needed: None patient stated he has all DME   Transportation at discharge:   IM/IMM Medicare/ letter given: n/a   Is patient a  and connected with [facility]?    If yes, was [place] transfer form completed and [facility] notified?   Caregiver Contact:   Discharge Caregiver contacted prior to discharge?   Care Conference needed?   Barriers to discharge: None

## 2024-01-11 ENCOUNTER — HOSPITAL ENCOUNTER (OUTPATIENT)
Facility: HOSPITAL | Age: 69
Setting detail: RECURRING SERIES
Discharge: HOME | End: 2024-01-14
Payer: MEDICARE

## 2024-01-11 PROCEDURE — 97161 PT EVAL LOW COMPLEX 20 MIN: CPT

## 2024-01-11 NOTE — TELEPHONE ENCOUNTER
Care Transitions Initial Follow Up Call    Outreach made within 2 business days of discharge: Yes    Patient: Edward Ordonez Patient : 1955   MRN: [id number]  Reason for Admission: There are no discharge diagnoses documented for the most recent discharge.  Discharge Date: 1/10/24       Spoke with: Judie José Luis, Spouse    Discharge department/facility: [facility], right knee [facility]    TCM Interactive Patient Contact:  Was patient able to fill all prescriptions: Yes  Was patient instructed to bring all medications to the follow-up visit: Yes  Is patient taking all medications as directed in the discharge summary? Yes  Does patient understand their discharge instructions: Yes  Does patient have questions or concerns that need addressed prior to 7-14 day follow up office visit: yes - NO    Scheduled appointment with PCP within 7-14 days    Follow Up  Future Appointments   Date Time Provider Department Center   2024  1:00 PM Antonio Griffin MD [facility] MAIN BS AMB   2024  2:45 PM Paulino Braun, PTA [facility]   2024 11:30 AM Paulino Braun PTA [facility]   2024  9:15 AM Josselyn Ronquillo PT [facility]   2024  2:45 PM Paulino Braun PTA [facility]   2024  8:00 AM Domo Max Jr., MD [facility] MAIN BS KASANDRA Flores LPN

## 2024-01-11 NOTE — PROGRESS NOTES
[facility]  [address] [place] [address]  Office [phone]  Fax [phone]       PHYSICAL THERAPY - MEDICARE EVALUATION/PLAN OF CARE NOTE (updated 3/23)      Date: 2024          Patient Name:  Edward Ordonez :  1955   Medical   Diagnosis:  Pain in right knee [M25.561] Treatment Diagnosis:  M25.561  RIGHT KNEE PAIN    Referral Source:  Maninder Osei MD Insurance:  Payor: HUMANA MEDICARE / Plan: HUMANA CHOICE-PPO MEDICARE / Product Type: *No Product type* /             Patient  verified yes     Visit #   Current  / Total 1 16     SUBJECTIVE  Pain Level (0-10 scale): 5-8/10  []constant []intermittent []improving []worsening []no change since onset    Any medication changes, allergies to medications, adverse drug reactions, diagnosis change, or new procedure performed?: [x] No    [] Yes (see summary sheet for update)  Medications: Verified on Patient Summary List    Subjective functional status/changes:     S/P R TKR 2024, patient scheduled to have L TKR 2023  Start of Care: 2024  PLOF: construction, fishing, ambulating indpendently  Comorbidities:h/o prior knee surgeries  Pt Goals: return to all activities without pain    OBJECTIVE    Posture:  stands with R knee slightly flexed  Other Observations:  post op bandage, mild inflammation/redness surrounding R knee  Gait and Functional Mobility:  Ambulates with RW with R knee flexed during stance, limited foot clearance  Palpation: minimal tenderness to R knee to palpation  ROM: AROM L knee flex 20-60        MMT: R hip and knee 3/5  Neurological: Reflexes / Sensations: sensation intact    Objective/Functional Outcome Measure: 30 second sit to stand        35 min [x]Eval - untimed                          Therapeutic Procedures:  Tx Min Billable or 1:1 Min (if diff from Tx Min) Procedure, Rationale, Specifics   5  30245 Therapeutic Exercise (timed):  increase ROM,

## 2024-01-16 ENCOUNTER — HOSPITAL ENCOUNTER (OUTPATIENT)
Facility: HOSPITAL | Age: 69
Setting detail: RECURRING SERIES
Discharge: HOME | End: 2024-01-19
Payer: MEDICARE

## 2024-01-16 ENCOUNTER — OFFICE VISIT (OUTPATIENT)
Dept: FAMILY MEDICINE CLINIC | Age: 69
End: 2024-01-16

## 2024-01-16 VITALS
WEIGHT: 199 LBS | BODY MASS INDEX: 28.49 KG/M2 | SYSTOLIC BLOOD PRESSURE: 162 MMHG | DIASTOLIC BLOOD PRESSURE: 87 MMHG | TEMPERATURE: 96.9 F | OXYGEN SATURATION: 97 % | RESPIRATION RATE: 16 BRPM | HEIGHT: 70 IN | HEART RATE: 90 BPM

## 2024-01-16 DIAGNOSIS — Z96.651 STATUS POST TOTAL RIGHT KNEE REPLACEMENT: ICD-10-CM

## 2024-01-16 DIAGNOSIS — Z09 HOSPITAL DISCHARGE FOLLOW-UP: Primary | ICD-10-CM

## 2024-01-16 DIAGNOSIS — I10 ESSENTIAL HYPERTENSION: ICD-10-CM

## 2024-01-16 PROCEDURE — 97110 THERAPEUTIC EXERCISES: CPT

## 2024-01-16 PROCEDURE — 97016 VASOPNEUMATIC DEVICE THERAPY: CPT

## 2024-01-16 RX ORDER — HYDROCODONE BITARTRATE AND ACETAMINOPHEN 5; 325 MG/1; MG/1
1 TABLET ORAL EVERY 6 HOURS PRN
COMMUNITY

## 2024-01-16 ASSESSMENT — PATIENT HEALTH QUESTIONNAIRE - PHQ9
SUM OF ALL RESPONSES TO PHQ QUESTIONS 1-9: 0
1. LITTLE INTEREST OR PLEASURE IN DOING THINGS: 0
SUM OF ALL RESPONSES TO PHQ QUESTIONS 1-9: 0
SUM OF ALL RESPONSES TO PHQ QUESTIONS 1-9: 0
2. FEELING DOWN, DEPRESSED OR HOPELESS: 0
SUM OF ALL RESPONSES TO PHQ QUESTIONS 1-9: 0
SUM OF ALL RESPONSES TO PHQ9 QUESTIONS 1 & 2: 0

## 2024-01-16 NOTE — PROGRESS NOTES
Post-Discharge Transitional Care Management Progress Note      Edward Ordonez   YOB: 1955    Date of Office Visit:  1/16/2024  Date of Hospital Admission: 1/9/204  Date of Hospital Discharge: 1/10/2024    Care management risk score Rising risk (score 2-5) and Complex Care (Scores >=6): No Risk Score On File     Non face to face  following discharge, date last encounter closed (first attempt may have been earlier): 01/10/2024 01/10/2024    Call initiated 2 business days of discharge: Yes    ASSESSMENT/PLAN:   Hospital discharge follow-up  -     CT DISCHARGE MEDS RECONCILED W/ CURRENT OUTPATIENT MED LIST    Medical Decision Making: moderate complexity  No follow-ups on file.         Subjective:   HPI:  Follow up of Hospital problems/diagnosis(es): R TKR on 1/9. Having a lot of pain and swelling. Started therapy. Taking BP meds    Inpatient course: Discharge summary reviewed- see chart.    Interval history/Current status: one week post op, some constipation, counseled on using PEG while on opioid. Reviewed signs and sxs of infection for which he should be reassessed before he sees ortho in 6 days.    Patient Active Problem List   Diagnosis    Essential hypertension    BPH without obstruction/lower urinary tract symptoms    Hypercholesterolemia    Encounter for hepatitis C screening test for low risk patient    Primary osteoarthritis of both knees       Medications listed as ordered at the time of discharge from hospital     Medication List            Accurate as of January 16, 2024 12:57 PM. If you have any questions, ask your nurse or doctor.                CONTINUE taking these medications      aspirin 81 MG chewable tablet  Take 1 tablet by mouth 2 times daily     ibuprofen 800 MG tablet  Commonly known as: ADVIL;MOTRIN  Take 1 tablet by mouth every 6 hours as needed for Pain     losartan 100 MG tablet  Commonly known as: COZAAR  Take 1 tablet by mouth daily     vitamin C 1000 MG tablet

## 2024-01-16 NOTE — PROGRESS NOTES
\"Have you been to the ER, urgent care clinic since your last visit?  Hospitalized since your last visit?\"     Yes - [facility] 1/9/24 thru 1/10/24 - Osteoarthritis of right knee     “Have you seen or consulted any other health care providers outside of [facility] since your last visit?”    NO

## 2024-01-16 NOTE — PROGRESS NOTES
PHYSICAL THERAPY - MEDICARE DAILY TREATMENT NOTE (updated 3/23)      Date: 2024          Patient Name:  Edward Ordonez :  1955   Medical   Diagnosis:  Pain in right knee [M25.561] Treatment Diagnosis:  M25.561  RIGHT KNEE PAIN    Referral Source:  Maninder Osei MD Insurance:   Payor: HUMANA MEDICARE / Plan: HUMANA GOLD PLUS HMO / Product Type: *No Product type* /                     Patient  verified yes     Visit #   Current  / Total 2 16   Time   In / Out 245 335   Total Treatment Time 50   Total Timed Codes 50   1:1 Treatment Time 50      [facility] Totals Reminder:  bill using total billable   min of TIMED therapeutic procedures and modalities.   8-22 min = 1 unit; 23-37 min = 2 units; 38-52 min = 3 units; 53-67 min = 4 units; 68-82 min = 5 units        .episode  SUBJECTIVE    Pain Level (0-10 scale): 5    Any medication changes, allergies to medications, adverse drug reactions, diagnosis change, or new procedure performed?: [x] No    [] Yes (see summary sheet for update)  Medications: Verified on Patient Summary List    Subjective functional status/changes:     Am I where I'm supposed to be by now? Am I ging to be ok, if I get the other leg done next week?    OBJECTIVE      Therapeutic Procedures:  Tx Min Billable or 1:1 Min (if diff from Tx Min) Procedure, Rationale, Specifics   35 35 14402 Therapeutic Exercise (timed):  increase ROM, strength, coordination, balance, and proprioception to improve patient's ability to progress to PLOF and address remaining functional goals. (see flow sheet as applicable)     Details if applicable:   QS 2 x 10  Heel slides with strap 2 x 10  SAQ 2 x 10  Hamstring sets 2x5      35 35    Total Total       Modalities Rationale:     decrease edema and decrease pain to improve patient's ability to progress to PLOF and address remaining functional goals.       min [] Estim Unattended,             type/location:       []  w/ice    []  w/heat        min [] Estim

## 2024-01-18 ENCOUNTER — HOSPITAL ENCOUNTER (OUTPATIENT)
Facility: HOSPITAL | Age: 69
Setting detail: RECURRING SERIES
Discharge: HOME | End: 2024-01-21
Payer: MEDICARE

## 2024-01-18 PROCEDURE — 97110 THERAPEUTIC EXERCISES: CPT

## 2024-01-18 NOTE — PROGRESS NOTES
PHYSICAL THERAPY - MEDICARE DAILY TREATMENT NOTE (updated 3/23)      Date: 2024          Patient Name:  Edward Ordonez :  1955   Medical   Diagnosis:  Pain in right knee [M25.561] Treatment Diagnosis:  M25.561  RIGHT KNEE PAIN    Referral Source:  Maninder Osei MD Insurance:   Payor: HUMANA MEDICARE / Plan: HUMANA GOLD PLUS HMO / Product Type: *No Product type* /                     Patient  verified yes     Visit #   Current  / Total 3 16   Time   In / Out 1130 1211   Total Treatment Time 41   Total Timed Codes 41   1:1 Treatment Time 41      [facility] Totals Reminder:  bill using total billable   min of TIMED therapeutic procedures and modalities.   8-22 min = 1 unit; 23-37 min = 2 units; 38-52 min = 3 units; 53-67 min = 4 units; 68-82 min = 5 units        .episode  SUBJECTIVE    Pain Level (0-10 scale): 3    Any medication changes, allergies to medications, adverse drug reactions, diagnosis change, or new procedure performed?: [x] No    [] Yes (see summary sheet for update)  Medications: Verified on Patient Summary List    Subjective functional status/changes:     That Ice had me hurting for days. I got my new ice machine in the mail yesterday. I'm gonna try it when I get home.    OBJECTIVE      Therapeutic Procedures:  Tx Min Billable or 1:1 Min (if diff from Tx Min) Procedure, Rationale, Specifics   45 45 34247 Therapeutic Exercise (timed):  increase ROM, strength, coordination, balance, and proprioception to improve patient's ability to progress to PLOF and address remaining functional goals. (see flow sheet as applicable)     Details if applicable:   QS 2 x 10  Heel drop 3 x 30\"  HS standing stretch 3 x 30\"  Heel slides with strap 2 x 10  SAQ 2 x 10  Hamstring sets 2x5   AAROM seated flexion 1 x 8   45 45    Total Total     [x]  Patient Education billed concurrently with other procedures   [x] Review HEP    [] Progressed/Changed HEP, detail:    [] Other detail:         Pain Level at end of

## 2024-01-19 LAB — IBUPROFEN SERPL-MCNC: 15 UG/ML (ref 10–50)

## 2024-01-20 PROBLEM — M17.12 PRIMARY OSTEOARTHRITIS OF LEFT KNEE: Status: ACTIVE | Noted: 2024-01-20

## 2024-01-20 NOTE — H&P
History and Physical    Patient: Edward Ordonez MRN: [id number]  SSN: [id number]    YOB: 1955  Age: 68 y.o.  Sex: male      Subjective:      Edward Ordonez is a 68 y.o. male who is 2 weeks status post right total knee arthroplasty.  He has end-stage osteoarthritis of his left knee as well.  The plan is to have both knees replaced within a 2 to 3 weeks..  He is here for his left total knee arthroplasty.  Previous discussion has been carried out concerning risk benefits and options surgical treatment and bilateral total knee arthroplasty..     Past Medical History:   Diagnosis Date    Class 1 obesity due to excess calories with serious comorbidity and body mass index (BMI) of 31.0 to 31.9 in adult 3/11/2020    Hypertension      Past Surgical History:   Procedure Laterality Date    COLONOSCOPY N/A 9/28/2021    COLONOSCOPY performed by Charo Loaiza MD at [facility] MAIN OR    ORTHOPEDIC SURGERY      SC UNLISTED PROCEDURE ABDOMEN PERITONEUM & OMENTUM  2009    part of intestines removed    TOTAL KNEE ARTHROPLASTY Right 1/9/2024    RIGHT TOTAL RIGHT KNEE REPLACEMENT performed by Maninder Osei MD at [facility] MAIN OR      Family History   Problem Relation Age of Onset    Cancer Father      Social History     Tobacco Use    Smoking status: Never     Passive exposure: Never    Smokeless tobacco: Never   Substance Use Topics    Alcohol use: Yes     Alcohol/week: 20.0 standard drinks of alcohol      Prior to Admission medications    Medication Sig Start Date End Date Taking? Authorizing Provider   HYDROcodone-acetaminophen (NORCO) 5-325 MG per tablet Take 1 tablet by mouth every 6 hours as needed for Pain. Max Daily Amount: 4 tablets    Provider, MD Shari   aspirin 81 MG chewable tablet Take 1 tablet by mouth 2 times daily 1/10/24   Maninder Osei MD   ibuprofen (ADVIL;MOTRIN) 800 MG tablet Take 1 tablet by mouth every 6 hours as needed for Pain 1/10/24   Maninder Osei MD   Ascorbic Acid (VITAMIN C) 1000

## 2024-01-23 ENCOUNTER — HOSPITAL ENCOUNTER (OUTPATIENT)
Facility: HOSPITAL | Age: 69
Setting detail: OBSERVATION
Discharge: HOME | End: 2024-01-24
Attending: ORTHOPAEDIC SURGERY | Admitting: ORTHOPAEDIC SURGERY
Payer: MEDICARE

## 2024-01-23 ENCOUNTER — APPOINTMENT (OUTPATIENT)
Facility: HOSPITAL | Age: 69
End: 2024-01-23
Attending: ORTHOPAEDIC SURGERY
Payer: MEDICARE

## 2024-01-23 ENCOUNTER — ANESTHESIA (OUTPATIENT)
Facility: HOSPITAL | Age: 69
End: 2024-01-23
Payer: MEDICARE

## 2024-01-23 ENCOUNTER — ANESTHESIA EVENT (OUTPATIENT)
Facility: HOSPITAL | Age: 69
End: 2024-01-23
Payer: MEDICARE

## 2024-01-23 DIAGNOSIS — M17.12 PRIMARY OSTEOARTHRITIS OF LEFT KNEE: Primary | ICD-10-CM

## 2024-01-23 PROCEDURE — 3600000015 HC SURGERY LEVEL 5 ADDTL 15MIN: Performed by: ORTHOPAEDIC SURGERY

## 2024-01-23 PROCEDURE — 97535 SELF CARE MNGMENT TRAINING: CPT

## 2024-01-23 PROCEDURE — G0378 HOSPITAL OBSERVATION PER HR: HCPCS

## 2024-01-23 PROCEDURE — 97161 PT EVAL LOW COMPLEX 20 MIN: CPT

## 2024-01-23 PROCEDURE — 73560 X-RAY EXAM OF KNEE 1 OR 2: CPT

## 2024-01-23 PROCEDURE — 6360000002 HC RX W HCPCS: Performed by: ORTHOPAEDIC SURGERY

## 2024-01-23 PROCEDURE — 6370000000 HC RX 637 (ALT 250 FOR IP): Performed by: ORTHOPAEDIC SURGERY

## 2024-01-23 PROCEDURE — 97750 PHYSICAL PERFORMANCE TEST: CPT

## 2024-01-23 PROCEDURE — 2500000003 HC RX 250 WO HCPCS: Performed by: ANESTHESIOLOGY

## 2024-01-23 PROCEDURE — A4216 STERILE WATER/SALINE, 10 ML: HCPCS | Performed by: ORTHOPAEDIC SURGERY

## 2024-01-23 PROCEDURE — 2580000003 HC RX 258: Performed by: ORTHOPAEDIC SURGERY

## 2024-01-23 PROCEDURE — 7100000001 HC PACU RECOVERY - ADDTL 15 MIN: Performed by: ORTHOPAEDIC SURGERY

## 2024-01-23 PROCEDURE — 7100000000 HC PACU RECOVERY - FIRST 15 MIN: Performed by: ORTHOPAEDIC SURGERY

## 2024-01-23 PROCEDURE — C1776 JOINT DEVICE (IMPLANTABLE): HCPCS | Performed by: ORTHOPAEDIC SURGERY

## 2024-01-23 PROCEDURE — 2709999900 HC NON-CHARGEABLE SUPPLY: Performed by: ORTHOPAEDIC SURGERY

## 2024-01-23 PROCEDURE — 97110 THERAPEUTIC EXERCISES: CPT

## 2024-01-23 PROCEDURE — 3700000000 HC ANESTHESIA ATTENDED CARE: Performed by: ORTHOPAEDIC SURGERY

## 2024-01-23 PROCEDURE — 2720000010 HC SURG SUPPLY STERILE: Performed by: ORTHOPAEDIC SURGERY

## 2024-01-23 PROCEDURE — 97165 OT EVAL LOW COMPLEX 30 MIN: CPT

## 2024-01-23 PROCEDURE — 3600000005 HC SURGERY LEVEL 5 BASE: Performed by: ORTHOPAEDIC SURGERY

## 2024-01-23 PROCEDURE — 64447 NJX AA&/STRD FEMORAL NRV IMG: CPT | Performed by: ANESTHESIOLOGY

## 2024-01-23 PROCEDURE — C1713 ANCHOR/SCREW BN/BN,TIS/BN: HCPCS | Performed by: ORTHOPAEDIC SURGERY

## 2024-01-23 PROCEDURE — 3700000001 HC ADD 15 MINUTES (ANESTHESIA): Performed by: ORTHOPAEDIC SURGERY

## 2024-01-23 PROCEDURE — 6360000002 HC RX W HCPCS: Performed by: ANESTHESIOLOGY

## 2024-01-23 DEVICE — TIBIAL TRAY ROTATING PLATFORM M.B.T. REVISION SIZE 4 CEMENTED: Type: IMPLANTABLE DEVICE | Site: KNEE | Status: FUNCTIONAL

## 2024-01-23 DEVICE — P.F.C. SIGMA OVAL DOME PATELLA 3-PEG 38MM CEMENTED
Type: IMPLANTABLE DEVICE | Site: KNEE | Status: FUNCTIONAL
Brand: P.F.C. SIGMA

## 2024-01-23 DEVICE — SIGMA FEMORAL CRUCIATE RETAINING POROCOAT SIZE 4 LEFT
Type: IMPLANTABLE DEVICE | Site: KNEE | Status: FUNCTIONAL
Brand: SIGMA

## 2024-01-23 DEVICE — P.F.C. SIGMA TIBIAL INSERT FIXED BEARING CURVED 4 (CVD) 10MM GVF
Type: IMPLANTABLE DEVICE | Site: KNEE | Status: FUNCTIONAL
Brand: P.F.C. SIGMA

## 2024-01-23 DEVICE — CEMENT BNE 40GM FULL DOSE PMMA W/ GENT HI VISC RADPQ LNG: Type: IMPLANTABLE DEVICE | Site: KNEE | Status: FUNCTIONAL

## 2024-01-23 DEVICE — P.F.C. SIGMA MODULAR STEM CEMENTED 13MM X 30MM
Type: IMPLANTABLE DEVICE | Site: KNEE | Status: FUNCTIONAL
Brand: P.F.C. SIGMA

## 2024-01-23 RX ORDER — MIDAZOLAM HYDROCHLORIDE 1 MG/ML
INJECTION INTRAMUSCULAR; INTRAVENOUS PRN
Status: DISCONTINUED | OUTPATIENT
Start: 2024-01-23 | End: 2024-01-23 | Stop reason: SDUPTHER

## 2024-01-23 RX ORDER — PROPOFOL 10 MG/ML
INJECTION, EMULSION INTRAVENOUS PRN
Status: DISCONTINUED | OUTPATIENT
Start: 2024-01-23 | End: 2024-01-23 | Stop reason: SDUPTHER

## 2024-01-23 RX ORDER — BUPIVACAINE HYDROCHLORIDE 5 MG/ML
INJECTION, SOLUTION EPIDURAL; INTRACAUDAL
Status: COMPLETED
Start: 2024-01-23 | End: 2024-01-23

## 2024-01-23 RX ORDER — POTASSIUM CHLORIDE 750 MG/1
40 TABLET, FILM COATED, EXTENDED RELEASE ORAL PRN
Status: DISCONTINUED | OUTPATIENT
Start: 2024-01-23 | End: 2024-01-24 | Stop reason: HOSPADM

## 2024-01-23 RX ORDER — TRANEXAMIC ACID 100 MG/ML
INJECTION, SOLUTION INTRAVENOUS PRN
Status: DISCONTINUED | OUTPATIENT
Start: 2024-01-23 | End: 2024-01-23 | Stop reason: SDUPTHER

## 2024-01-23 RX ORDER — HYDROCODONE BITARTRATE AND ACETAMINOPHEN 5; 325 MG/1; MG/1
1 TABLET ORAL EVERY 4 HOURS PRN
Status: DISCONTINUED | OUTPATIENT
Start: 2024-01-23 | End: 2024-01-24 | Stop reason: HOSPADM

## 2024-01-23 RX ORDER — ONDANSETRON 2 MG/ML
4 INJECTION INTRAMUSCULAR; INTRAVENOUS EVERY 6 HOURS PRN
Status: DISCONTINUED | OUTPATIENT
Start: 2024-01-23 | End: 2024-01-24 | Stop reason: HOSPADM

## 2024-01-23 RX ORDER — SODIUM CHLORIDE, SODIUM LACTATE, POTASSIUM CHLORIDE, CALCIUM CHLORIDE 600; 310; 30; 20 MG/100ML; MG/100ML; MG/100ML; MG/100ML
INJECTION, SOLUTION INTRAVENOUS CONTINUOUS
Status: DISCONTINUED | OUTPATIENT
Start: 2024-01-23 | End: 2024-01-23

## 2024-01-23 RX ORDER — ACETAMINOPHEN 325 MG/1
650 TABLET ORAL EVERY 6 HOURS PRN
Status: DISCONTINUED | OUTPATIENT
Start: 2024-01-23 | End: 2024-01-24 | Stop reason: HOSPADM

## 2024-01-23 RX ORDER — ACETAMINOPHEN 650 MG/1
650 SUPPOSITORY RECTAL EVERY 6 HOURS PRN
Status: DISCONTINUED | OUTPATIENT
Start: 2024-01-23 | End: 2024-01-24 | Stop reason: HOSPADM

## 2024-01-23 RX ORDER — SODIUM CHLORIDE 9 MG/ML
INJECTION, SOLUTION INTRAVENOUS PRN
Status: DISCONTINUED | OUTPATIENT
Start: 2024-01-23 | End: 2024-01-24 | Stop reason: HOSPADM

## 2024-01-23 RX ORDER — PROMETHAZINE HYDROCHLORIDE 25 MG/1
12.5 TABLET ORAL EVERY 6 HOURS PRN
Status: DISCONTINUED | OUTPATIENT
Start: 2024-01-23 | End: 2024-01-24 | Stop reason: HOSPADM

## 2024-01-23 RX ORDER — POTASSIUM CHLORIDE 7.45 MG/ML
10 INJECTION INTRAVENOUS PRN
Status: DISCONTINUED | OUTPATIENT
Start: 2024-01-23 | End: 2024-01-24 | Stop reason: HOSPADM

## 2024-01-23 RX ORDER — FENTANYL CITRATE 50 UG/ML
25 INJECTION, SOLUTION INTRAMUSCULAR; INTRAVENOUS EVERY 10 MIN PRN
Status: DISCONTINUED | OUTPATIENT
Start: 2024-01-23 | End: 2024-01-23

## 2024-01-23 RX ORDER — MAGNESIUM SULFATE IN WATER 40 MG/ML
2000 INJECTION, SOLUTION INTRAVENOUS PRN
Status: DISCONTINUED | OUTPATIENT
Start: 2024-01-23 | End: 2024-01-24 | Stop reason: HOSPADM

## 2024-01-23 RX ORDER — BUPIVACAINE HYDROCHLORIDE 5 MG/ML
INJECTION, SOLUTION EPIDURAL; INTRACAUDAL
Status: COMPLETED | OUTPATIENT
Start: 2024-01-23 | End: 2024-01-23

## 2024-01-23 RX ORDER — POLYETHYLENE GLYCOL 3350 17 G/17G
17 POWDER, FOR SOLUTION ORAL DAILY PRN
Status: DISCONTINUED | OUTPATIENT
Start: 2024-01-23 | End: 2024-01-24 | Stop reason: HOSPADM

## 2024-01-23 RX ORDER — GABAPENTIN 100 MG/1
100 CAPSULE ORAL ONCE
Status: COMPLETED | OUTPATIENT
Start: 2024-01-23 | End: 2024-01-23

## 2024-01-23 RX ORDER — SODIUM CHLORIDE 0.9 % (FLUSH) 0.9 %
5-40 SYRINGE (ML) INJECTION EVERY 12 HOURS SCHEDULED
Status: DISCONTINUED | OUTPATIENT
Start: 2024-01-23 | End: 2024-01-24 | Stop reason: HOSPADM

## 2024-01-23 RX ORDER — SODIUM CHLORIDE 0.9 % (FLUSH) 0.9 %
5-40 SYRINGE (ML) INJECTION PRN
Status: DISCONTINUED | OUTPATIENT
Start: 2024-01-23 | End: 2024-01-24 | Stop reason: HOSPADM

## 2024-01-23 RX ORDER — BUPIVACAINE HYDROCHLORIDE 5 MG/ML
INJECTION, SOLUTION EPIDURAL; INTRACAUDAL PRN
Status: DISCONTINUED | OUTPATIENT
Start: 2024-01-23 | End: 2024-01-23

## 2024-01-23 RX ORDER — ASPIRIN 81 MG/1
81 TABLET, CHEWABLE ORAL 2 TIMES DAILY
Status: DISCONTINUED | OUTPATIENT
Start: 2024-01-23 | End: 2024-01-24 | Stop reason: HOSPADM

## 2024-01-23 RX ORDER — SODIUM CHLORIDE, SODIUM LACTATE, POTASSIUM CHLORIDE, CALCIUM CHLORIDE 600; 310; 30; 20 MG/100ML; MG/100ML; MG/100ML; MG/100ML
INJECTION, SOLUTION INTRAVENOUS CONTINUOUS
Status: DISCONTINUED | OUTPATIENT
Start: 2024-01-23 | End: 2024-01-24

## 2024-01-23 RX ORDER — CELECOXIB 100 MG/1
200 CAPSULE ORAL ONCE
Status: COMPLETED | OUTPATIENT
Start: 2024-01-23 | End: 2024-01-23

## 2024-01-23 RX ADMIN — PROPOFOL 20 MG: 10 INJECTION, EMULSION INTRAVENOUS at 08:20

## 2024-01-23 RX ADMIN — SODIUM CHLORIDE, POTASSIUM CHLORIDE, SODIUM LACTATE AND CALCIUM CHLORIDE: 600; 310; 30; 20 INJECTION, SOLUTION INTRAVENOUS at 12:09

## 2024-01-23 RX ADMIN — HYDROCODONE BITARTRATE AND ACETAMINOPHEN 1 TABLET: 5; 325 TABLET ORAL at 16:08

## 2024-01-23 RX ADMIN — CEFAZOLIN 2000 MG: 2 INJECTION, POWDER, FOR SOLUTION INTRAMUSCULAR; INTRAVENOUS at 16:09

## 2024-01-23 RX ADMIN — SODIUM CHLORIDE, POTASSIUM CHLORIDE, SODIUM LACTATE AND CALCIUM CHLORIDE: 600; 310; 30; 20 INJECTION, SOLUTION INTRAVENOUS at 07:14

## 2024-01-23 RX ADMIN — CEFAZOLIN 2000 MG: 2 INJECTION, POWDER, FOR SOLUTION INTRAMUSCULAR; INTRAVENOUS at 08:15

## 2024-01-23 RX ADMIN — PROPOFOL 30 MG: 10 INJECTION, EMULSION INTRAVENOUS at 08:11

## 2024-01-23 RX ADMIN — SODIUM CHLORIDE, PRESERVATIVE FREE 10 ML: 5 INJECTION INTRAVENOUS at 21:22

## 2024-01-23 RX ADMIN — BUPIVACAINE HYDROCHLORIDE 20 ML: 5 INJECTION, SOLUTION EPIDURAL; INTRACAUDAL at 10:45

## 2024-01-23 RX ADMIN — HYDROMORPHONE HYDROCHLORIDE 1 MG: 1 INJECTION, SOLUTION INTRAMUSCULAR; INTRAVENOUS; SUBCUTANEOUS at 18:05

## 2024-01-23 RX ADMIN — SODIUM CHLORIDE, POTASSIUM CHLORIDE, SODIUM LACTATE AND CALCIUM CHLORIDE: 600; 310; 30; 20 INJECTION, SOLUTION INTRAVENOUS at 08:25

## 2024-01-23 RX ADMIN — ASPIRIN 81 MG: 81 TABLET, CHEWABLE ORAL at 12:09

## 2024-01-23 RX ADMIN — GABAPENTIN 100 MG: 100 CAPSULE ORAL at 07:17

## 2024-01-23 RX ADMIN — HYDROCODONE BITARTRATE AND ACETAMINOPHEN 1 TABLET: 5; 325 TABLET ORAL at 21:30

## 2024-01-23 RX ADMIN — HYDROMORPHONE HYDROCHLORIDE 1 MG: 1 INJECTION, SOLUTION INTRAMUSCULAR; INTRAVENOUS; SUBCUTANEOUS at 13:58

## 2024-01-23 RX ADMIN — CELECOXIB 200 MG: 100 CAPSULE ORAL at 07:17

## 2024-01-23 RX ADMIN — BUPIVACAINE HYDROCHLORIDE: 5 INJECTION, SOLUTION EPIDURAL; INTRACAUDAL; PERINEURAL at 08:47

## 2024-01-23 RX ADMIN — PROPOFOL 50 MG: 10 INJECTION, EMULSION INTRAVENOUS at 08:09

## 2024-01-23 RX ADMIN — PROPOFOL 50 MCG/KG/MIN: 10 INJECTION, EMULSION INTRAVENOUS at 08:10

## 2024-01-23 RX ADMIN — TRANEXAMIC ACID 1000 MG: 1 INJECTION, SOLUTION INTRAVENOUS at 08:16

## 2024-01-23 RX ADMIN — MIDAZOLAM HYDROCHLORIDE 2 MG: 1 INJECTION, SOLUTION INTRAMUSCULAR; INTRAVENOUS at 07:58

## 2024-01-23 RX ADMIN — ASPIRIN 81 MG: 81 TABLET, CHEWABLE ORAL at 21:21

## 2024-01-23 RX ADMIN — BUPIVACAINE HYDROCHLORIDE 2.5 MG: 5 INJECTION, SOLUTION EPIDURAL; INTRACAUDAL; PERINEURAL at 08:00

## 2024-01-23 ASSESSMENT — PAIN SCALES - GENERAL
PAINLEVEL_OUTOF10: 5
PAINLEVEL_OUTOF10: 6
PAINLEVEL_OUTOF10: 6
PAINLEVEL_OUTOF10: 5
PAINLEVEL_OUTOF10: 7
PAINLEVEL_OUTOF10: 5

## 2024-01-23 ASSESSMENT — PAIN DESCRIPTION - LOCATION
LOCATION: KNEE

## 2024-01-23 ASSESSMENT — PAIN - FUNCTIONAL ASSESSMENT: PAIN_FUNCTIONAL_ASSESSMENT: 0-10

## 2024-01-23 ASSESSMENT — PAIN DESCRIPTION - DESCRIPTORS
DESCRIPTORS: ACHING

## 2024-01-23 ASSESSMENT — PAIN DESCRIPTION - ORIENTATION
ORIENTATION: LEFT

## 2024-01-23 NOTE — ANESTHESIA PROCEDURE NOTES
Peripheral Block    Patient location during procedure: post-op  Reason for block: post-op pain management and at surgeon's request  Start time: 1/23/2024 10:45 AM  End time: 1/23/2024 10:50 AM  Staffing  Performed: anesthesiologist   Anesthesiologist: Mickey Vences MD  Performed by: Mickey Vences MD  Authorized by: Mickey Vences MD    Preanesthetic Checklist  Completed: patient identified, IV checked, site marked, risks and benefits discussed, surgical/procedural consents, equipment checked, pre-op evaluation, timeout performed, anesthesia consent given, oxygen available, monitors applied/VS acknowledged, fire risk safety assessment completed and verbalized and blood product R/B/A discussed and consented  Peripheral Block   Patient position: supine  Prep: ChloraPrep  Provider prep: mask and sterile gloves  Patient monitoring: cardiac monitor, continuous pulse ox, continuous capnometry, frequent blood pressure checks, IV access, oxygen and responsive to questions  Block type: Femoral  Adductor canal  Laterality: left  Injection technique: single-shot  Guidance: ultrasound guided    Needle   Needle type: insulated echogenic nerve stimulator needle   Needle gauge: 21 G  Needle localization: ultrasound guidance  Needle length: 8 cm  Assessment   Injection assessment: negative aspiration for heme, no paresthesia on injection, local visualized surrounding nerve on ultrasound and no intravascular symptoms  Paresthesia pain: none  Slow fractionated injection: yes  Hemodynamics: stable  Real-time US image taken/store: yes  Outcomes: uncomplicated    Medications Administered  BUPivacaine (MARCAINE) PF injection 0.5% - Perineural   20 mL - 1/23/2024 10:45:00 AM

## 2024-01-23 NOTE — CARE COORDINATION
Care Management Initial Assessment       RUR: N/A  Readmission? No  1st IM letter given? No  1st  letter given: No         01/23/24 8851   Service Assessment   Patient Orientation Alert and Oriented;Person;Place;Situation;Self   Cognition Alert   History Provided By Patient   Primary Caregiver Self   Support Systems Spouse/Significant Other   PCP Verified by CM Yes  (Domo Max MD, Saw Dr. Griffin 1/16/24)   Last Visit to PCP Within last 3 months   Prior Functional Level Assistance with the following:;Mobility   Current Functional Level Independent in ADLs/IADLs   Can patient return to prior living arrangement Yes   Family able to assist with home care needs: Yes   Would you like for me to discuss the discharge plan with any other family members/significant others, and if so, who? Yes  (Spouse)   Financial Resources Medicare   Community Resources None   Social/Functional History   Lives With Spouse   Type of Home House   Home Equipment Walker, rolling;Grab bars;Reacher   Active  Yes   Discharge Planning   Type of Residence House   Patient expects to be discharged to: House     Mr. Ordonez has been admitted under Observation post knee surgery today. He anticipates to be discharged tomorrow 1/24/24. He said he has all the equipment he needs and is scheduled to start his outpatient therapy this Thursday 1/25/24 at [facility].   The MOON was explained/signed/received: copy to patient and copy to chart.  The CM introductory letter with contact information was given.  Mr. Ordonez lives with his spouse in [place] and is normally independent.  Mrs. Ordonez will be able to transport him home.

## 2024-01-23 NOTE — PROGRESS NOTES
Postop check:    Subjective: He is comfortable laying in bed.  He says the right knee feels very good.  The left knee is starting to become a little uncomfortable.    Objective: He has good movement of the toes.  There is 2+ palpable dorsalis pedis and posterior tib pulses in the left lower extremity.  His dressing is dry.  Postoperative x-rays have demonstrated excellent position of the total knee components.  He is afebrile and his vital signs are stable.    Assessment: Stable postoperative left total knee arthroplasty status post right total knee arthroplasty 1 week ago.    Plan: We will get him up with physical therapy weightbearing as tolerated on both lower extremities.  Anticipate discharge tomorrow morning if he looks good.

## 2024-01-23 NOTE — PROGRESS NOTES
TRANSFER - OUT REPORT:    Verbal report given to MAGDA Tinsley RN on Edward Ordonez  being transferred to /S for routine progression of patient care       Report consisted of patient's Situation, Background, Assessment and   Recommendations(SBAR).     Information from the following report(s) Surgery Report and MAR was reviewed with the receiving nurse.           Lines:   Peripheral IV 01/23/24 Distal;Posterior;Right Forearm (Active)   Site Assessment Clean, dry & intact 01/23/24 1045   Line Status Infusing 01/23/24 1045   Phlebitis Assessment No symptoms 01/23/24 1045   Infiltration Assessment 0 01/23/24 1045   Alcohol Cap Used No 01/23/24 0713   Dressing Status Clean, dry & intact 01/23/24 1045   Dressing Type Transparent 01/23/24 1045   Dressing Intervention New 01/23/24 0713        Opportunity for questions and clarification was provided.      Patient transported with:  Registered Nurse

## 2024-01-23 NOTE — OP NOTE
[facility]  OPERATIVE REPORT    Name:  DARIO BARNETT  MR#:  [id number]  :  1955  ACCOUNT #:  [id number]  DATE OF SERVICE:  2024    PREOPERATIVE DIAGNOSIS:  Severe osteoarthritis, left knee, with varus deformity.    POSTOPERATIVE DIAGNOSIS:  Severe osteoarthritis, left knee, with varus deformity.    PROCEDURE PERFORMED:  Left total knee replacement with press-fit #4 femur, cemented 32 patella, and cemented long stem tibial component and 10-mm rotating-platform poly WI.    SURGEON:  Maninder Figueroa MD    ASSISTANT:  None.    ANESTHESIA:  Spinal with sedation.    COMPLICATIONS:  None.    SPECIMENS REMOVED:  None.    IMPLANTS:  [employer] PFC Sigma implants.    ESTIMATED BLOOD LOSS:  300 mL.    TOURNIQUET TIME:  No tourniquet was used during the procedure.    FINDINGS:  Severe varus arthritis with deficiency of the medial tibia.    PROCEDURE:  The patient was brought to the operating room after evaluation outpatient.  Laid supine on the operating table.  Spinal was given prior to this.  A bolster was placed on the left hip and the tourniquet was applied to the left lower extremity but was not used throughout the procedure.  The left leg was then prepped and draped in the usual sterile fashion.  A time-out was called verifying the patient's site of surgery and surgical procedure.  The area of the incision was anesthetized with combination of Toradol, Marcaine and epinephrine in the subcutaneous tissue to assist with bleeding.  A longitudinal incision was made over the anterior aspect of the knee.  Dissection carried sharply through skin and subcutaneous tissue down to the fascia.  Flaps were elevated sharply with a knife.  Bleeders were controlled with electrocautery.  The extensor mechanism was identified.  The quadriceps tendon was split along its edge approximately half a centimeter lateral to the medial edge of the tendon, and this was carried into the medial parapatellar incision

## 2024-01-23 NOTE — ANESTHESIA PRE PROCEDURE
Neuro/Psych:               GI/Hepatic/Renal:             Endo/Other:                     Abdominal: normal exam            Vascular:          Other Findings:           Anesthesia Plan      regional, spinal and MAC     ASA 2       Induction: intravenous.    MIPS: Prophylactic antiemetics administered.  Anesthetic plan and risks discussed with patient.              Post-op pain plan if not by surgeon: single peripheral nerve block            Mickey Vences MD   1/23/2024

## 2024-01-23 NOTE — CARE COORDINATION
.Advance Care Planning     General Advance Care Planning (ACP) Conversation    Date of Conversation: 01/23/24    Conducted with: Patient with Decision Making Capacity    Healthcare Decision Maker:    Primary Decision Maker: Judie Ordonez - Spouse - [phone]  Click here to complete Healthcare Decision Makers including selection of the Healthcare Decision Maker Relationship (ie \"Primary\").   Today we documented Decision Maker(s) consistent with Legal Next of Kin hierarchy.    Content/Action Overview:  Has NO ACP documents/care preferences - information provided, considering goals and options  Mr. Ordonez was given the AD template at prior admission. I noted if he has completed this we would like a copy for the chart.    Reviewed DNR/DNI and patient elects Full Code (Attempt Resuscitation)  Length of Voluntary ACP Conversation in minutes:  <16 minutes (Non-Billable)    BRANDON Flores

## 2024-01-23 NOTE — ANESTHESIA POSTPROCEDURE EVALUATION
Department of Anesthesiology  Postprocedure Note    Patient: Edward Ordonez  MRN: [id number]  YOB: 1955  Date of evaluation: 1/23/2024    Procedure Summary     Date: 01/23/24 Room / Location: [facility] MAIN OR 2 / [facility] MAIN OR    Anesthesia Start: 0757 Anesthesia Stop:     Procedure: TOTAL LEFT KNEE REPLACEMENT (Left: Knee) Diagnosis:       Primary osteoarthritis of left knee      (Primary osteoarthritis of left knee [M17.12])    Surgeons: Maninder Osei MD Responsible Provider: Mickey Vences MD    Anesthesia Type: regional, spinal, MAC ASA Status: 2          Anesthesia Type: No value filed.    Kaya Phase I:      Kaya Phase II:      Anesthesia Post Evaluation    Patient location during evaluation: PACU  Patient participation: complete - patient participated  Level of consciousness: awake and alert  Pain score: 0  Nausea & Vomiting: no nausea and no vomiting  Cardiovascular status: blood pressure returned to baseline  Respiratory status: acceptable and room air  Hydration status: euvolemic  Multimodal analgesia pain management approach  Pain management: adequate        No notable events documented.

## 2024-01-23 NOTE — ANESTHESIA PROCEDURE NOTES
Spinal Block    Patient location during procedure: OR  End time: 1/23/2024 8:05 AM  Reason for block: primary anesthetic  Staffing  Performed: anesthesiologist   Anesthesiologist: Mickey Vences MD  Performed by: Mickey Vences MD  Authorized by: Mickey Vences MD    Spinal Block  Patient position: sitting  Prep: ChloraPrep  Patient monitoring: cardiac monitor, continuous pulse ox, continuous capnometry and frequent blood pressure checks  Approach: midline  Location: L3/L4  Provider prep: mask and sterile gloves  Local infiltration: lidocaine  Needle  Needle type: Adrian   Needle gauge: 25 G  Needle length: 3.5 in  Assessment  Sensory level: T10  Swirl obtained: Yes  CSF: clear  Attempts: 1  Hemodynamics: stable  Preanesthetic Checklist  Completed: patient identified, IV checked, site marked, risks and benefits discussed, surgical/procedural consents, equipment checked, pre-op evaluation, timeout performed, anesthesia consent given, oxygen available, monitors applied/VS acknowledged, fire risk safety assessment completed and verbalized and blood product R/B/A discussed and consented

## 2024-01-23 NOTE — BRIEF OP NOTE
Brief Postoperative Note      Patient: Edward Ordonez  YOB: 1955  MRN: [id number]    Date of Procedure: 1/23/2024    Pre-Op Diagnosis Codes:     * Primary osteoarthritis of left knee [M17.12]    Post-Op Diagnosis: Same       Procedure(s):  TOTAL LEFT KNEE REPLACEMENT    Surgeon(s):  Maninder Osei MD    Assistant:  * No surgical staff found *    Anesthesia: General    Estimated Blood Loss (mL): 300     Complications: None    Specimens:   * No specimens in log *    Implants:  Implant Name Type Inv. Item Serial No.  Lot No. LRB No. Used Action   CEMENT BNE 40GM FULL DOSE PMMA W/ GENT HI VISC RADPQ LNG - SNA Cement CEMENT BNE 40GM FULL DOSE PMMA W/ GENT HI VISC RADPQ LNG NA [facility] [id number] Left 2 Implanted   COMPONENT PAT [id number] DST POLY OVL DOME 3 PEG NP REJI MOD - SNA Joint Component COMPONENT PAT [id number] DST POLY OVL DOME 3 PEG NP REJI MOD NA [facility] [id number] Left 1 Implanted   INSERT TIB SZ 4 [id number] GVF CRV ROT PLATFRM PFC SIG - SNA Joint Component INSERT TIB SZ 4 [id number] GVF CRV ROT PLATFRM PFC SIG NA [facility] [id number] Left 1 Implanted   COMPONENT FEM SZ 4 L KNEE CO CHROM MOLYBDENUM PORCOAT CRUCE - SNA Joint Component COMPONENT FEM SZ 4 L KNEE CO CHROM MOLYBDENUM PORCOAT CRUCE NA [facility] [id number] Left 1 Implanted   STEM FEM L30MM [id number] UNIV KNEE REJI FOR ROT HNG SYS S-ROM - SNA Joint Component STEM FEM L30MM [id number] UNIV KNEE REJI FOR ROT HNG SYS S-ROM NA [facility] [id number] Left 1 Implanted   TRAY TIB SZ 4 AP49.3MM ML74.9MM THK4.8MM KNEE CO CHROM ALLY - SNA Joint Component TRAY TIB SZ 4 AP49.3MM ML74.9MM THK4.8MM KNEE CO CHROM ALLY NA [facility] [id number] Left 1 Implanted         Drains: * No LDAs found *    Findings: severe OA left knee      Electronically signed by Maninder Osei MD on 1/23/2024 at 10:44 AM

## 2024-01-24 VITALS
BODY MASS INDEX: 28.49 KG/M2 | HEIGHT: 70 IN | DIASTOLIC BLOOD PRESSURE: 76 MMHG | TEMPERATURE: 98.1 F | HEART RATE: 106 BPM | SYSTOLIC BLOOD PRESSURE: 162 MMHG | WEIGHT: 199 LBS | OXYGEN SATURATION: 97 % | RESPIRATION RATE: 20 BRPM

## 2024-01-24 PROBLEM — M17.12 PRIMARY OSTEOARTHRITIS OF LEFT KNEE: Status: RESOLVED | Noted: 2024-01-20 | Resolved: 2024-01-24

## 2024-01-24 PROCEDURE — 2580000003 HC RX 258: Performed by: ORTHOPAEDIC SURGERY

## 2024-01-24 PROCEDURE — G0378 HOSPITAL OBSERVATION PER HR: HCPCS

## 2024-01-24 PROCEDURE — 6360000002 HC RX W HCPCS: Performed by: ORTHOPAEDIC SURGERY

## 2024-01-24 PROCEDURE — 6370000000 HC RX 637 (ALT 250 FOR IP): Performed by: ORTHOPAEDIC SURGERY

## 2024-01-24 PROCEDURE — 96374 THER/PROPH/DIAG INJ IV PUSH: CPT

## 2024-01-24 PROCEDURE — 97110 THERAPEUTIC EXERCISES: CPT

## 2024-01-24 RX ORDER — HYDROMORPHONE HYDROCHLORIDE 2 MG/1
2 TABLET ORAL EVERY 6 HOURS PRN
Qty: 20 TABLET | Refills: 0 | Status: SHIPPED | OUTPATIENT
Start: 2024-01-24 | End: 2024-01-29

## 2024-01-24 RX ORDER — ZOLPIDEM TARTRATE 5 MG/1
5 TABLET ORAL NIGHTLY PRN
Qty: 14 TABLET | Refills: 0 | Status: SHIPPED | OUTPATIENT
Start: 2024-01-24 | End: 2024-02-07

## 2024-01-24 RX ADMIN — ASPIRIN 81 MG: 81 TABLET, CHEWABLE ORAL at 08:28

## 2024-01-24 RX ADMIN — HYDROCODONE BITARTRATE AND ACETAMINOPHEN 1 TABLET: 5; 325 TABLET ORAL at 06:46

## 2024-01-24 RX ADMIN — IBUPROFEN 800 MG: 600 TABLET ORAL at 08:28

## 2024-01-24 RX ADMIN — CEFAZOLIN 2000 MG: 2 INJECTION, POWDER, FOR SOLUTION INTRAMUSCULAR; INTRAVENOUS at 00:58

## 2024-01-24 RX ADMIN — SODIUM CHLORIDE, PRESERVATIVE FREE 10 ML: 5 INJECTION INTRAVENOUS at 08:29

## 2024-01-24 RX ADMIN — HYDROCODONE BITARTRATE AND ACETAMINOPHEN 1 TABLET: 5; 325 TABLET ORAL at 10:59

## 2024-01-24 RX ADMIN — SODIUM CHLORIDE, POTASSIUM CHLORIDE, SODIUM LACTATE AND CALCIUM CHLORIDE: 600; 310; 30; 20 INJECTION, SOLUTION INTRAVENOUS at 00:53

## 2024-01-24 RX ADMIN — IBUPROFEN 800 MG: 600 TABLET ORAL at 00:53

## 2024-01-24 ASSESSMENT — PAIN DESCRIPTION - DESCRIPTORS
DESCRIPTORS: NAGGING
DESCRIPTORS: ACHING

## 2024-01-24 ASSESSMENT — PAIN DESCRIPTION - LOCATION
LOCATION: KNEE
LOCATION: KNEE

## 2024-01-24 ASSESSMENT — PAIN SCALES - GENERAL
PAINLEVEL_OUTOF10: 6
PAINLEVEL_OUTOF10: 4
PAINLEVEL_OUTOF10: 6
PAINLEVEL_OUTOF10: 4
PAINLEVEL_OUTOF10: 2

## 2024-01-24 ASSESSMENT — PAIN SCALES - WONG BAKER
WONGBAKER_NUMERICALRESPONSE: 0
WONGBAKER_NUMERICALRESPONSE: 0

## 2024-01-24 ASSESSMENT — PAIN DESCRIPTION - PAIN TYPE: TYPE: SURGICAL PAIN

## 2024-01-24 ASSESSMENT — PAIN DESCRIPTION - ORIENTATION
ORIENTATION: LEFT
ORIENTATION: LEFT;POSTERIOR

## 2024-01-24 NOTE — CARE COORDINATION
01/24/24 0951   Services At/After Discharge   Transition of Care Consult (CM Consult) Other  (OP PT)   Services At/After Discharge PT   [place] Resource Information Provided? No   Mode of Transport at Discharge Self   Confirm Follow Up Transport Family   Condition of Participation: Discharge Planning   The Plan for Transition of Care is related to the following treatment goals: Gain strength, mobility and endurance--[facility] OP PT   The Patient and/or Patient Representative was provided with a Choice of Provider? Patient   The Patient and/Or Patient Representative agree with the Discharge Plan? Yes   Freedom of Choice list was provided with basic dialogue that supports the patient's individualized plan of care/goals, treatment preferences, and shares the quality data associated with the providers?  Yes     Mr. Ordonez is being discharged today to home. He will be going to [facility] OP PT tomorrow 1/25. Mr. Ordonez is aware and agrees with dc plan. He is aware to contact CM for any questions or concerns post discharge.     Transition of Care Plan:    RUR:   Prior Level of Functioning:   Disposition:   If SNF or IPR: Date FOC offered:   Date FOC received:   Accepting facility:   Date authorization started with reference number:   Date authorization received and expires:   Follow up appointments:    1. Maninder Osei MD 1/7 at 0845   2. Antonio Griffin MD 1/30 at 1320   3. [facility] OP PT 1/25   DME needed: None  Transportation at discharge:   IM/IMM Medicare/ letter given:   Is patient a [place] and connected with [facility]?    If yes, was [place] transfer form completed and [facility] notified?   Caregiver Contact:   Discharge Caregiver contacted prior to discharge?   Care Conference needed?   Barriers to discharge: None

## 2024-01-24 NOTE — PLAN OF CARE
Problem: Occupational Therapy - Adult  Goal: By Discharge: Performs self-care activities at highest level of function for planned discharge setting.  See evaluation for individualized goals.  1/23/2024 1649 by Morris Coulter OT  Outcome: Not Progressing  OCCUPATIONAL THERAPY EVALUATION    Patient: Edward Ordonez (68 y.o. male)  Date: 1/23/2024  Primary Diagnosis: Primary osteoarthritis of left knee [M17.12]  Procedure(s) (LRB):  TOTAL LEFT KNEE REPLACEMENT (Left) Day of Surgery   FUNCTIONAL STATUS PRIOR TO ADMISSION:     , ADL Assistance: Independent,  ,  ,  ,  ,  ,  , Ambulation Assistance: Independent, Transfer Assistance: Independent,       HOME SUPPORT: Patient lived alone with spouse  to provide assistance.    Occupational Therapy Goals:  Initiated 1/23/2024  1.  Patient will perform lower body dressing with Modified [place] within 7 day(s).  2.  Patient will perform shower transfer with Modified [place] within 7 day(s).  3.  Patient will perform toileting with Modified [place] within 7 day(s).    Precautions:                    ASSESSMENT :  The patient is limited by decreased functional mobility, independence in ADLs, high-level IADLs, ROM, strength, endurance.    Based on the impairments listed above pt requires skilled OT services in order to return to PLOF for safe home going w/ spouse. Pt approached supine and agreeable to therapy. Pt able to transfer supine<>EOB w/ supervision and completed LB dressing donning socks at EOB w/ min A. Pt STS from EOB to 2WW w/ CGA and functional mobility into hallway and back w/ 2WW and CGA. Pt returned to chair. Vitals taken and BP noted to drop to 107/64 and pre activity BP was 156/75. Pt left in chair w/ all needs met, call light/alarm in place, and nursing notified.    Functional Outcome Measure:  The patient scored 21/24 on the 6 clicks outcome measure      PLAN :  Recommendations and Planned Interventions:   self care training, therapeutic 
  Problem: Pain  Goal: Verbalizes/displays adequate comfort level or baseline comfort level  Outcome: Progressing     
  Procedure Laterality Date    COLONOSCOPY N/A 9/28/2021    COLONOSCOPY performed by Charo Loaiza MD at [facility] MAIN OR    ORTHOPEDIC SURGERY      MA UNLISTED PROCEDURE ABDOMEN PERITONEUM & OMENTUM  2009    part of intestines removed    TOTAL KNEE ARTHROPLASTY Right 1/9/2024    RIGHT TOTAL RIGHT KNEE REPLACEMENT performed by Maninder Osei MD at [facility] MAIN OR    TOTAL KNEE ARTHROPLASTY Left 1/23/2024    TOTAL LEFT KNEE REPLACEMENT performed by Maninder Osei MD at [facility] MAIN OR       Home Situation:  Social/Functional History  Lives With: Spouse  Type of Home: House  Home Layout: One level  Home Access: Stairs to enter with rails  Entrance Stairs - Number of Steps: 4  Entrance Stairs - Rails: Both  Bathroom Shower/Tub: Walk-in shower  Bathroom Equipment: Shower chair  Home Equipment: Walker, rolling, Grab bars, Reacher  Receives Help From: Family  ADL Assistance: Independent  Ambulation Assistance: Independent  Transfer Assistance: Independent  Active : Yes  Mode of Transportation: Truck    Cognitive/Behavioral Status:  Orientation  Overall Orientation Status: Within Normal Limits  Orientation Level: Oriented X4  Cognition  Overall Cognitive Status: WNL    Hearing:   Hearing  Hearing: Within functional limits    Vision/Perceptual:          Vision  Vision: Impaired  Vision Exceptions: Wears glasses at all times       Strength:    Strength: Generally decreased, functional    Tone & Sensation:   Tone: Normal  Sensation: Intact    Coordination:  Coordination: Generally decreased, functional    Range Of Motion:  AROM: Generally decreased, functional       Functional Mobility:  Bed Mobility:     Bed Mobility Training  Bed Mobility Training: Yes  Overall Level of Assistance: Stand-by assistance  Interventions: Safety awareness training;Verbal cues  Rolling: Stand-by assistance  Supine to Sit: Stand-by assistance  Scooting: Stand-by assistance  Transfers:     Transfer Training  Transfer Training: Yes  Overall Level 
feet/min)  Step Length: Left shortened;Right shortened  Gait Abnormalities: Antalgic;Decreased step clearance  Rail Use: Both  Stairs - Level of Assistance: Stand-by assistance  Number of Stairs Trained: 10        Therapeutic Exercises:     EXERCISE   Sets   Reps   Active Active Assist   Passive Self ROM   Comments   Ankle Pumps 1 12 [x] [] [] []    Quad Sets 1 10 [x] [] [] []    Hamstring Sets   [] [] [] []    Gluteal Sets   [] [] [] []    Short Arc Quads 1 10 [] [x] [] []    Heel Slides 1 10 [] [x] [] []    Straight Leg Raises   [] [] [] []    Hip abd/add 1 10 [] [x] [] []    Long Arc Quads   [] [] [] []    Marching   [] [] [] []       [] [] [] []            Pain Ratin-7/10 L knee  Pain Intervention(s):   patient medicated for pain prior to session, ice, rest, elevation, and repositioning    Activity Tolerance:   Fair , requires rest breaks, and SpO2 stable on room air    After treatment:   Patient left in no apparent distress sitting up in chair, Call bell within reach, Caregiver / family present, and Heels elevated for pressure relief      COMMUNICATION/EDUCATION:   The patient's plan of care was discussed with: occupational therapist, registered nurse, and certified nursing assistant/patient care technician    Patient Education  Education Given To: Patient;Family  Education Provided: Role of Therapy;Plan of Care;Home Exercise Program;Precautions;Transfer Training;Fall Prevention Strategies;Equipment  Education Provided Comments: cues for safety and technique when transferring and ambulating w/ AD to reduce fall risk; reviewed supine HEP activities; reviewed safety and technique on stairs  Education Method: Verbal  Barriers to Learning: None  Education Outcome: Verbalized understanding;Demonstrated understanding      VINCENZO SOTELO PT  Minutes: 16

## 2024-01-24 NOTE — DISCHARGE INSTRUCTIONS
[facility]  Dr. Maninder Osei MD    After Hospital Care Plan:    Discharge Instructions Knee Replacement-Dr. Osei    Patient Name  Edward Ordonez  Date of procedure  [unfilled]    Procedure  Procedure(s):  TOTAL LEFT KNEE REPLACEMENT  Surgeon  Surgeon(s) and Role:     * Maninder Osei MD - Primary  Date of discharge: [unfilled]  PCP: @PCP@    Follow up appointments  Follow up with Dr. Osei in 2 weeks.  Call [phone] to make an appointment.    If home health has been arranged for you the agency will contact you to arrange dates/times for visits.  Please call them if you do not hear from them within 24 hours after you are discharged    When to call your Orthopaedic Surgeon: Call [phone]. If you call after 5pm or on a weekend, the on call physician will be contacted  Unrelieved pain  Signs of infection-if your incision is red; continues to have drainage; drainage has a foul odor or if you have a persistent fever over 101 degrees  Signs of a blood clot in your leg-calf pain, tenderness, redness, swelling of lower leg    When to call your Primary Care Physician:  Concerns about medical conditions such as diabetes, high blood pressure, asthma, congestive heart failure  Call if blood sugars are elevated, persistent headache or dizziness, coughing or congestion, constipation or diarrhea, burning with urination, abnormal heart rate    When to call [phone] go to the nearest emergency room  Acute onset of chest pain, shortness of breath, difficulty breathing      Activity  Weight bearing as tolerated with walker or crutches.   Complete your Home Exercise Program daily as instructed by your therapist.    Get up every one hour and walk (except at night when sleeping)  Do not drive or operate heavy machinery      Incision Care  You will have staples in your knee incision; they will be removed at the surgeons office visit in 2 weeks. Leave the occlusive dressing in place for at least 7 days or for 2 weeks

## 2024-01-25 ENCOUNTER — HOSPITAL ENCOUNTER (OUTPATIENT)
Facility: HOSPITAL | Age: 69
Setting detail: RECURRING SERIES
Discharge: HOME | End: 2024-01-28
Payer: MEDICARE

## 2024-01-25 ENCOUNTER — TELEPHONE (OUTPATIENT)
Age: 69
End: 2024-01-25

## 2024-01-25 PROCEDURE — 97162 PT EVAL MOD COMPLEX 30 MIN: CPT

## 2024-01-25 NOTE — PROGRESS NOTES
[facility]  [address] [place] [address]  Office [phone]  Fax [phone]       PHYSICAL THERAPY - MEDICARE EVALUATION/PLAN OF CARE NOTE (updated 3/23)      Date: 2024          Patient Name:  Edward Ordonez :  1955   Medical   Diagnosis:  Pain in left knee [M25.562] Treatment Diagnosis:  M25.562  LEFT KNEE PAIN    Referral Source:  Maninder Osei MD Insurance:  Payor: [employer] MEDICARE / Plan: [employer] GOLD PLUS HMO / Product Type: *No Product type* /             Patient  verified yes     Visit #   Current  / Total 1 TBD     SUBJECTIVE  Pain Level (0-10 scale): L knee 9/10 with mobility (did not state start of session), R knee 5/10 pre and post  [x]constant []intermittent []improving []worsening []no change since onset    Any medication changes, allergies to medications, adverse drug reactions, diagnosis change, or new procedure performed?: [x] No    [] Yes (see summary sheet for update)  Medications: Verified on Patient Summary List    Subjective functional status/changes:     Pt with L TKA 2024    Onset Date: chronic L knee pain   Start of Care: 2024  PLOF: Independent, limited by B Knee pain. Mod I with RW since R TKA 2024.  Limitations to PLOF: recent R TKA 2024    Mechanism of Injury: chronic/progressive arthritis   Current symptoms/Complaints: significant pain L knee, antalgic gait, medial knee pain > other areas  Previous Treatment/Compliance: good, currently participating in R knee HEP  PMHx/Surgical Hx: R TKA 2024  Prior Hospitalization:  colonoscopy , hernia repair , colon repair , 5 knee surgeries [id number], 2 hospital stays in 2024 for R and L TKA  Comorbidities: none listed  Work Hx: self-employed, construction  Living Situation: Lives with wife in 1 story home, 5 OSMEL with B HR (wide). Walk-in shower, no grab bars. Recommended pt purchase shower chair 2/2 recent B TKAs    Pt

## 2024-01-25 NOTE — TELEPHONE ENCOUNTER
Please call patient for a KENA call.  Patient d/c'd on 1/24/24 and has follow up appt made for 1/30/24

## 2024-01-26 NOTE — TELEPHONE ENCOUNTER
Care Transitions Initial Follow Up Call    Outreach made within 2 business days of discharge: Yes    Patient: Edward Ordonez Patient : 1955   MRN: [id number]  Reason for Admission: There are no discharge diagnoses documented for the most recent discharge.  Discharge Date: 24       Spoke with: Patient    Discharge department/facility: [facility] - Left Knee Replacement    TCM Interactive Patient Contact:  Was patient able to fill all prescriptions: Yes  Was patient instructed to bring all medications to the follow-up visit: Yes  Is patient taking all medications as directed in the discharge summary? Yes  Does patient understand their discharge instructions: Yes  Does patient have questions or concerns that need addressed prior to 7-14 day follow up office visit: yes     Scheduled appointment with PCP within 7-14 days    Follow Up  Future Appointments   Date Time Provider Department Center   2024  1:20 PM Antonio Griffin MD [facility] MAIN BS AMB   2024  2:45 PM Paulino Braun PTA [facility]   2024  8:00 AM Domo Max Jr., MD [facility] MAIN BS AMB       Dave Flores LPN

## 2024-01-30 ENCOUNTER — HOSPITAL ENCOUNTER (OUTPATIENT)
Facility: HOSPITAL | Age: 69
Setting detail: RECURRING SERIES
Discharge: HOME | End: 2024-02-02
Payer: MEDICARE

## 2024-01-30 ENCOUNTER — OFFICE VISIT (OUTPATIENT)
Dept: FAMILY MEDICINE CLINIC | Age: 69
End: 2024-01-30

## 2024-01-30 VITALS
DIASTOLIC BLOOD PRESSURE: 76 MMHG | RESPIRATION RATE: 16 BRPM | WEIGHT: 193 LBS | BODY MASS INDEX: 27.63 KG/M2 | HEIGHT: 70 IN | HEART RATE: 104 BPM | TEMPERATURE: 96.9 F | OXYGEN SATURATION: 97 % | SYSTOLIC BLOOD PRESSURE: 155 MMHG

## 2024-01-30 DIAGNOSIS — Z09 HOSPITAL DISCHARGE FOLLOW-UP: Primary | ICD-10-CM

## 2024-01-30 DIAGNOSIS — F51.01 PRIMARY INSOMNIA: ICD-10-CM

## 2024-01-30 PROCEDURE — 97140 MANUAL THERAPY 1/> REGIONS: CPT

## 2024-01-30 PROCEDURE — 97110 THERAPEUTIC EXERCISES: CPT

## 2024-01-30 RX ORDER — MIRTAZAPINE 7.5 MG/1
7.5 TABLET, FILM COATED ORAL NIGHTLY
Qty: 30 TABLET | Refills: 0 | Status: SHIPPED | OUTPATIENT
Start: 2024-01-30

## 2024-01-30 ASSESSMENT — PATIENT HEALTH QUESTIONNAIRE - PHQ9
SUM OF ALL RESPONSES TO PHQ9 QUESTIONS 1 & 2: 0
SUM OF ALL RESPONSES TO PHQ QUESTIONS 1-9: 0
SUM OF ALL RESPONSES TO PHQ QUESTIONS 1-9: 0
1. LITTLE INTEREST OR PLEASURE IN DOING THINGS: 0
SUM OF ALL RESPONSES TO PHQ QUESTIONS 1-9: 0
2. FEELING DOWN, DEPRESSED OR HOPELESS: 0
SUM OF ALL RESPONSES TO PHQ QUESTIONS 1-9: 0

## 2024-01-30 NOTE — PROGRESS NOTES
\"Have you been to the ER, urgent care clinic since your last visit?  Hospitalized since your last visit?\"     Yes - 1/23/24 thru 1/24/24 [facility] - Osteoarthritis of Left Knee     “Have you seen or consulted any other health care providers outside of [facility] since your last visit?”    NO

## 2024-01-30 NOTE — PROGRESS NOTES
Post-Discharge Transitional Care  Follow Up      Edward Ordonez   YOB: 1955    Date of Office Visit:  1/30/2024  Date of Hospital Admission: 1/23/24  Date of Hospital Discharge: 1/24/24  Risk of hospital readmission (high >=14%. Medium >=10%) :No data recorded    Care management risk score Rising risk (score 2-5) and Complex Care (Scores >=6): No Risk Score On File     Non face to face  following discharge, date last encounter closed (first attempt may have been earlier): 01/25/2024    Call initiated 2 business days of discharge: Yes    ASSESSMENT/PLAN:   Hospital discharge follow-up  -     SD DISCHARGE MEDS RECONCILED W/ CURRENT OUTPATIENT MED LIST  Primary insomnia  -     mirtazapine (REMERON) 7.5 MG tablet; Take 1 tablet by mouth nightly, Disp-30 tablet, R-0Normal    Medical Decision Making: moderate complexity  No follow-ups on file.           Subjective:   HPI:  Follow up of Hospital problems/diagnosis(es): S/P L TKA 1/23. Doing well, moderate pain, participating in PT twice weekly, they've told him ROM is excellent. Poor sleep from discomfort    Inpatient course: Discharge summary reviewed- see chart.    Interval history/Current status: Doing well    Patient Active Problem List   Diagnosis    Essential hypertension    BPH without obstruction/lower urinary tract symptoms    Hypercholesterolemia    Encounter for hepatitis C screening test for low risk patient    Primary osteoarthritis of both knees       Medications listed as ordered at the time of discharge from hospital     Medication List            Accurate as of January 30, 2024  1:45 PM. If you have any questions, ask your nurse or doctor.                START taking these medications      mirtazapine 7.5 MG tablet  Commonly known as: REMERON  Take 1 tablet by mouth nightly  Started by: Antonio Griffin MD            CONTINUE taking these medications      aspirin 81 MG chewable tablet  Take 1 tablet by mouth 2 times daily     ibuprofen

## 2024-01-30 NOTE — PROGRESS NOTES
PHYSICAL THERAPY - MEDICARE DAILY TREATMENT NOTE (updated 3/23)      Date: 2024          Patient Name:  Edward Ordonez :  1955   Medical   Diagnosis:  Pain in right knee [M25.561]  Pain in left knee [M25.562] Treatment Diagnosis:  M25.561  RIGHT KNEE PAIN    Referral Source:  Maninder Osei MD Insurance:   Payor: HU[employer]NA MEDICARE / Plan: HUMANA GOLD PLUS HMO / Product Type: *No Product type* /                     Patient  verified yes     Visit #   Current  / Total 4 16   Time   In / Out 1445 1500   Total Treatment Time 15   Total Timed Codes 15   1:1 Treatment Time 15      [facility] Totals Reminder:  bill using total billable   min of TIMED therapeutic procedures and modalities.   8-22 min = 1 unit; 23-37 min = 2 units; 38-52 min = 3 units; 53-67 min = 4 units; 68-82 min = 5 units        .episode  SUBJECTIVE    Pain Level (0-10 scale): 1    Any medication changes, allergies to medications, adverse drug reactions, diagnosis change, or new procedure performed?: [x] No    [] Yes (see summary sheet for update)  Medications: Verified on Patient Summary List    Subjective functional status/changes:     This one is doing better now.    OBJECTIVE      Therapeutic Procedures:  Tx Min Billable or 1:1 Min (if diff from Tx Min) Procedure, Rationale, Specifics   15 15 60765 Therapeutic Exercise (timed):  increase ROM, strength, coordination, balance, and proprioception to improve patient's ability to progress to PLOF and address remaining functional goals. (see flow sheet as applicable)     Details if applicable:   QS 2 x 10  Heel drop 3 x 30\"  HS standing stretch 3 x 30\"  Heel slides with strap 2 x 10  SAQ 2 x 10     15 15    Total Total     [x]  Patient Education billed concurrently with other procedures   [x] Review HEP    [] Progressed/Changed HEP, detail:    [] Other detail:         Pain Level at end of session (0-10 scale): 8      Assessment   Great improvement in R knee actvity tolerance, with AAOM of 
therapeutic activities interventions . (see flow sheet as applicable)     Details if applicable:    STM to Quads and HS in supine, for ROM assistance.    30 30    Total Total         [x]  Patient Education billed concurrently with other procedures   [x] Review HEP    [] Progressed/Changed HEP, detail:    [] Other detail:           Pain Level at end of session (0-10 scale): 6      Assessment   C/o Medial patellar pain. Ex fairly tolerated. Pt refused CP or Vaso, opting for icing at home. Minimal swelling edema.   Patient will continue to benefit from skilled PT / OT services to modify and progress therapeutic interventions to address functional deficits and attain remaining goals.    Progress toward goals / Updated goals:  []  See Progress Note/Recertification      PLAN  Yes  Continue plan of care  Re-Cert Due: 4/25/2024  [x]  Upgrade activities as tolerated  []  Discharge due to :  []  Other:      Paulino Braun PTA       1/30/2024       4:28 PM

## 2024-02-01 ENCOUNTER — HOSPITAL ENCOUNTER (OUTPATIENT)
Facility: HOSPITAL | Age: 69
Setting detail: RECURRING SERIES
Discharge: HOME | End: 2024-02-04
Payer: MEDICARE

## 2024-02-01 PROCEDURE — 97140 MANUAL THERAPY 1/> REGIONS: CPT

## 2024-02-01 PROCEDURE — 97110 THERAPEUTIC EXERCISES: CPT

## 2024-02-01 NOTE — PROGRESS NOTES
PHYSICAL THERAPY - MEDICARE DAILY TREATMENT NOTE (updated 3/23)      Date: 2024          Patient Name:  Edward Ordonez :  1955   Medical   Diagnosis:  Pain in right knee [M25.561]  Pain in left knee [M25.562] Treatment Diagnosis:  M25.561  RIGHT KNEE PAIN    Referral Source:  Maninder Osei MD Insurance:   Payor: HUMANA MEDICARE / Plan: HU[employer]NA GOLD PLUS HMO / Product Type: *No Product type* /                     Patient  verified yes     Visit #   Current  / Total 5 16   Time   In / Out 1445 1500   Total Treatment Time 15   Total Timed Codes 15   1:1 Treatment Time 15      [facility] Totals Reminder:  bill using total billable   min of TIMED therapeutic procedures and modalities.   8-22 min = 1 unit; 23-37 min = 2 units; 38-52 min = 3 units; 53-67 min = 4 units; 68-82 min = 5 units        .episode  SUBJECTIVE    Pain Level (0-10 scale): 1    Any medication changes, allergies to medications, adverse drug reactions, diagnosis change, or new procedure performed?: [x] No    [] Yes (see summary sheet for update)  Medications: Verified on Patient Summary List    Subjective functional status/changes:     I'm happy with this one, but the other one is getting there.     OBJECTIVE      Therapeutic Procedures:  Tx Min Billable or 1:1 Min (if diff from Tx Min) Procedure, Rationale, Specifics   30 30 27009 Therapeutic Exercise (timed):  increase ROM, strength, coordination, balance, and proprioception to improve patient's ability to progress to PLOF and address remaining functional goals. (see flow sheet as applicable)     Details if applicable:   QS 2 x 10  Heel drop 3 x 30\"  HS standing stretch 3 x 30\"  Heel slides with strap 2 x 10  SAQ 2 x 10     30 30    Total Total     [x]  Patient Education billed concurrently with other procedures   [x] Review HEP    [] Progressed/Changed HEP, detail:    [] Other detail:         Pain Level at end of session (0-10 scale): 8      Assessment   Improving edema and tolerance to

## 2024-02-01 NOTE — PROGRESS NOTES
PHYSICAL THERAPY - MEDICARE DAILY TREATMENT NOTE (updated 3/23)      Date: 2024          Patient Name:  Edward Ordonez :  1955   Medical   Diagnosis:  Pain in right knee [M25.561]  Pain in left knee [M25.562] Treatment Diagnosis:  M25.562  LEFT KNEE PAIN    Referral Source:  Maninder Osei MD Insurance:   Payor: HUMANA MEDICARE / Plan: HUMANA GOLD PLUS HMO / Product Type: *No Product type* /                     Patient  verified yes     Visit #   Current  / Total 2 16   Time   In / Out 1500 1530   Total Treatment Time 30   Total Timed Codes 30   1:1 Treatment Time 30      [facility] Totals Reminder:  bill using total billable   min of TIMED therapeutic procedures and modalities.   8-22 min = 1 unit; 23-37 min = 2 units; 38-52 min = 3 units; 53-67 min = 4 units; 68-82 min = 5 units        .episode  SUBJECTIVE    Pain Level (0-10 scale): 5    Any medication changes, allergies to medications, adverse drug reactions, diagnosis change, or new procedure performed?: [x] No    [] Yes (see summary sheet for update)  Medications: Verified on Patient Summary List    Subjective functional status/changes:     The medicine only lasts for 1 or 2 hrs.     OBJECTIVE      Therapeutic Procedures:  Tx Min Billable or 1:1 Min (if diff from Tx Min) Procedure, Rationale, Specifics   15 15 59855 Therapeutic Exercise (timed):  increase ROM, strength, coordination, balance, and proprioception to improve patient's ability to progress to PLOF and address remaining functional goals. (see flow sheet as applicable)     Details if applicable:    QS 2 x 10  Heel drop 3 x 30\"  HS standing stretch 3 x 30\"  Heel slides with strap 2 x 10  SAQ 2 x 10   10 10 98881 Manual Therapy (timed):  decrease pain, increase ROM, increase tissue extensibility, and decrease edema to improve patient's ability to progress to PLOF and address remaining functional goals.  The manual therapy interventions were performed at a separate and distinct time from

## 2024-02-05 DIAGNOSIS — M17.12 PRIMARY OSTEOARTHRITIS OF LEFT KNEE: Primary | ICD-10-CM

## 2024-02-05 RX ORDER — TRAMADOL HYDROCHLORIDE 50 MG/1
50 TABLET ORAL EVERY 6 HOURS PRN
Qty: 20 TABLET | Refills: 0 | Status: SHIPPED | OUTPATIENT
Start: 2024-02-05 | End: 2024-02-10

## 2024-02-05 NOTE — PROGRESS NOTES
Patient is status post bilateral total knee arthroplasty.  He is still having moderate pain in his left knee which was done 2 weeks ago.  Additional pain medicine was  E scribed to his pharmacy.

## 2024-02-06 ENCOUNTER — HOSPITAL ENCOUNTER (OUTPATIENT)
Facility: HOSPITAL | Age: 69
Setting detail: RECURRING SERIES
Discharge: HOME | End: 2024-02-09
Payer: MEDICARE

## 2024-02-06 PROCEDURE — 97110 THERAPEUTIC EXERCISES: CPT

## 2024-02-06 NOTE — PROGRESS NOTES
PHYSICAL THERAPY - MEDICARE DAILY TREATMENT NOTE (updated 3/23)      Date: 2024          Patient Name:  Edward Ordonez :  1955   Medical   Diagnosis:  Pain in right knee [M25.561]  Pain in left knee [M25.562] Treatment Diagnosis:  M25.562  LEFT KNEE PAIN    Referral Source:  Maninder Osei MD Insurance:   Payor: HUMANA MEDICARE / Plan: HUMANA GOLD PLUS HMO / Product Type: *No Product type* /                     Patient  verified yes     Visit #   Current  / Total 3 16   Time   In / Out 1500 1530   Total Treatment Time 30   Total Timed Codes 30   1:1 Treatment Time 30      [facility] Totals Reminder:  bill using total billable   min of TIMED therapeutic procedures and modalities.   8-22 min = 1 unit; 23-37 min = 2 units; 38-52 min = 3 units; 53-67 min = 4 units; 68-82 min = 5 units        .episode  SUBJECTIVE    Pain Level (0-10 scale): 5    Any medication changes, allergies to medications, adverse drug reactions, diagnosis change, or new procedure performed?: [x] No    [] Yes (see summary sheet for update)  Medications: Verified on Patient Summary List    Subjective functional status/changes:     I     OBJECTIVE      Therapeutic Procedures:  Tx Min Billable or 1:1 Min (if diff from Tx Min) Procedure, Rationale, Specifics   25 25 46921 Therapeutic Exercise (timed):  increase ROM, strength, coordination, balance, and proprioception to improve patient's ability to progress to PLOF and address remaining functional goals. (see flow sheet as applicable)     Details if applicable:    Nu-step 6 L1  QS 2 x 10  Heel drop 3 x 30\"  HS standing stretch 3 x 30\"  Stair lunge 3  Heel slides with strap 2 x 10  SAQ 2 x 10  Burmese LAQ 2 x 10   25 25    Total Total         [x]  Patient Education billed concurrently with other procedures   [x] Review HEP    [] Progressed/Changed HEP, detail:    [] Other detail:           Pain Level at end of session (0-10 scale): 3      Assessment   Pt reports improving Pain since Bandage 
8      Assessment   Pt reports improving  nocturnal pain and feeling that he has turned the corner. Pt's stair lunge is very good on for first attempt.     Patient will continue to benefit from skilled PT / OT services to modify and progress therapeutic interventions to address functional deficits and attain remaining goals.    Progress toward goals / Updated goals:  []  See Progress Note/Recertification    PLAN  Yes  Continue plan of care  Re-Cert Due: 4/11/24  [x]  Upgrade activities as tolerated  []  Discharge due to :  []  Other:      Paulino Braun, GRAHAM       2/6/2024       5:11 PM

## 2024-02-08 ENCOUNTER — HOSPITAL ENCOUNTER (OUTPATIENT)
Facility: HOSPITAL | Age: 69
Setting detail: RECURRING SERIES
Discharge: HOME | End: 2024-02-11
Payer: MEDICARE

## 2024-02-08 PROCEDURE — 97110 THERAPEUTIC EXERCISES: CPT

## 2024-02-08 NOTE — PROGRESS NOTES
[facility]  [address] [place] [address]  Office [phone]  Fax [phone]   PHYSICAL THERAPY PROGRESS NOTE  Patient Name:  Edward Ordonez :  1955   Treatment/Medical Diagnosis: Pain in right knee [M25.561]  Pain in left knee [M25.562]   Referral Source:  Maninder Osei MD     Date of Initial Visit:  2024 R knee, L knee 2024 Attended Visits:  R7/L5 Missed Visits:       SUMMARY OF TREATMENT/ASSESSMENT:  Patient is s/p R and L TKR    CURRENT STATUS  Patient ambulating without a device independently, wide REMEDIOS, decreased L knee extension at stance, decreased L hip and knee flexion during swing  R knee flexion 5-115, L knee flex 10-75 with assistance  30 second sit to stand 3x-limited due to LLE  R knee pain 0/10, L knee pain 3-4/10      R knee goals  Short Term Goals: To be accomplished in 8 treatments   Patient will be independent in a HEP to increase his mobility.MET  AROM R knee flexion 0-90 to assist with functional activities.MET FOR FLEXION, PROGRESSION EXTENSION  Patient will return to driving.NOT MET  Patient will ambulate with a cane or without a device independently.MET  Patient will report his worst pain is a 4/10 with activities.MET FOR RLE  Long Term Goals: To be accomplished in 16 treatments   AROM R knee flexion 0-115 to assist with functional activities.  Patient will return to prior activities without pain.  Patient will ambulate without a device and without gait deviations.     L knee goals  Short Term Goals: To be accomplished in 8 treatments  Pt will demonstrate L knee flexion up to 90deg independently for improved functional ROM.PROGRESSING  2.  Pt will demonstrate L knee extension fully (to zero deg) independently for improved functional ROM and normalized gait.PROGRESSING  3.  Pt will report pain consistently <5/10 at rest with L knee during mobility for improved tolerance to daily tasks.PROGRESSING   
treatments  Pt will demonstrate L knee flexion ROM up to 130deg independently for improved functional ROM.  2.  Pt will ambulate community level distances without device, normalized gait, and limited pain for improved tolerance to daily tasks.  3.  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    PLAN  Yes  Continue plan of care  Re-Cert Due: L 4/25,R 4/11  []  Upgrade activities as tolerated  []  Discharge due to :  []  Other:      Josselyn Ronquillo, PT       2/8/2024       9:48 AM

## 2024-02-12 DIAGNOSIS — M17.12 PRIMARY OSTEOARTHRITIS OF LEFT KNEE: Primary | ICD-10-CM

## 2024-02-12 RX ORDER — TRAMADOL HYDROCHLORIDE 50 MG/1
50 TABLET ORAL EVERY 6 HOURS PRN
Qty: 20 TABLET | Refills: 0 | Status: SHIPPED | OUTPATIENT
Start: 2024-02-12 | End: 2024-02-17

## 2024-02-12 NOTE — PROGRESS NOTES
Patient is status post bilateral total knee arthroplasty.  He needs additional pain medication for nighttime use.

## 2024-02-13 ENCOUNTER — HOSPITAL ENCOUNTER (OUTPATIENT)
Facility: HOSPITAL | Age: 69
Setting detail: RECURRING SERIES
Discharge: HOME | End: 2024-02-16
Payer: MEDICARE

## 2024-02-13 PROCEDURE — 97110 THERAPEUTIC EXERCISES: CPT

## 2024-02-13 NOTE — PROGRESS NOTES
PHYSICAL THERAPY - MEDICARE DAILY TREATMENT NOTE (updated 3/23)      Date: 2024             Patient Name:  Edward Ordonez :  1955   Medical   Diagnosis:  Pain in right knee [M25.561]  Pain in left knee [M25.562] Treatment Diagnosis:  M25.561  RIGHT KNEE PAIN and M25.562  LEFT KNEE PAIN    Referral Source:  Maninder Osei MD Insurance:   Payor: HUMANA MEDICARE / Plan: HUMANA GOLD PLUS HMO / Product Type: *No Product type* /                           Patient  verified yes     Visit #   Current  / Total 8R/6L 16   Time   In / Out 10  1045   Total Treatment Time 45   Total Timed Codes 45   1:1 Treatment Time 45      [facility] Totals Reminder:  bill using total billable   min of TIMED therapeutic procedures and modalities.   8-22 min = 1 unit; 23-37 min = 2 units; 38-52 min = 3 units; 53-67 min = 4 units; 68-82 min = 5 units            SUBJECTIVE     Pain Level (0-10 scale): R 0, L 5/10     Any medication changes, allergies to medications, adverse drug reactions, diagnosis change, or new procedure performed?: [x] No    [] Yes (see summary sheet for update)  Medications: Verified on Patient Summary List     Subjective functional status/changes:     R TKR , L TKR   Increased L knee pain today     OBJECTIVE        Therapeutic Procedures:  Tx Min Billable or 1:1 Min (if diff from Tx Min) Procedure, Rationale, Specifics   45  45   60612 Therapeutic Exercise (timed):  increase ROM, strength, coordination, balance, and proprioception to improve patient's ability to progress to PLOF and address remaining functional goals. (see flow sheet as applicable)      Details if applicable:       Nu-step 10',L1  QS 2 x 10  Heel drop 3 x 30\"  HS standing stretch 3 x 30\"  Stair lunge 2nd step 5x  Heel slides with therapist assistance 2x10  Sitting hamstring stretch 3x 30\"  Sidelying knee flexion stretch LLE  Sitting on edge of mat LLE stretch   supine LE wall slides knee extension and flexion

## 2024-02-15 ENCOUNTER — HOSPITAL ENCOUNTER (OUTPATIENT)
Facility: HOSPITAL | Age: 69
Setting detail: RECURRING SERIES
Discharge: HOME | End: 2024-02-18
Payer: MEDICARE

## 2024-02-15 PROCEDURE — 97110 THERAPEUTIC EXERCISES: CPT

## 2024-02-15 NOTE — PROGRESS NOTES
PHYSICAL THERAPY - MEDICARE DAILY TREATMENT NOTE (updated 3/23)      Date: 2/15/2024               Patient Name:  Edward Ordonez :  1955   Medical   Diagnosis:  Pain in right knee [M25.561]  Pain in left knee [M25.562] Treatment Diagnosis:  M25.561  RIGHT KNEE PAIN and M25.562  LEFT KNEE PAIN    Referral Source:  Maninder Osei MD Insurance:   Payor: HUMANA MEDICARE / Plan: HUMANA GOLD PLUS HMO / Product Type: *No Product type* /                              Patient  verified yes     Visit #   Current  / Total 9R/7L 16   Time   In / Out 115 2   Total Treatment Time 45   Total Timed Codes 45   1:1 Treatment Time 45      [facility] Totals Reminder:  bill using total billable   min of TIMED therapeutic procedures and modalities.   8-22 min = 1 unit; 23-37 min = 2 units; 38-52 min = 3 units; 53-67 min = 4 units; 68-82 min = 5 units            SUBJECTIVE     Pain Level (0-10 scale): R 0, L 4/10     Any medication changes, allergies to medications, adverse drug reactions, diagnosis change, or new procedure performed?: [x] No    [] Yes (see summary sheet for update)  Medications: Verified on Patient Summary List     Subjective functional status/changes:     R TKR , L TKR   Increased L knee pain today     OBJECTIVE        Therapeutic Procedures:  Tx Min Billable or 1:1 Min (if diff from Tx Min) Procedure, Rationale, Specifics   45  45   97305 Therapeutic Exercise (timed):  increase ROM, strength, coordination, balance, and proprioception to improve patient's ability to progress to PLOF and address remaining functional goals. (see flow sheet as applicable)      Details if applicable:       Nu-step 10',L1  QS 2 x 10  Heel drop 3 x 30\"    Heel slides with therapist assistance 2x10  Sitting hamstring stretch 3x 30\"  Sidelying knee flexion stretch LLE  Sitting on edge of mat LLE stretch   supine LE wall slides knee extension and flexion                                   45 45     Total Total           [x]  
knee during mobility for improved tolerance to daily tasks.PROGRESSING     Long Term Goals: To be accomplished in 16 treatments  Pt will demonstrate L knee flexion ROM up to 130deg independently for improved functional ROM.  2.  Pt will ambulate community level distances without device, normalized gait, and limited pain for improved tolerance to daily tasks.  3.  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           RECOMMENDATIONS  Recommend skilled PT 1-2 times/week up to 20 visits to progress towards goals and return to prior activities.  Requesting 10 additional visits for each knee, L and R , to increase mobility.  Currently, insurance has allowed 10 visits for each knee.              Josselyn Ronquillo, PT       2/15/2024       1:28 PM    If you have any questions/comments please contact us directly at [phone].   Thank you for allowing us to assist in the care of your patient.

## 2024-02-20 ENCOUNTER — HOSPITAL ENCOUNTER (OUTPATIENT)
Facility: HOSPITAL | Age: 69
Setting detail: RECURRING SERIES
Discharge: HOME | End: 2024-02-23
Payer: MEDICARE

## 2024-02-20 PROCEDURE — 97110 THERAPEUTIC EXERCISES: CPT

## 2024-02-20 NOTE — PROGRESS NOTES
PHYSICAL THERAPY - MEDICARE DAILY TREATMENT NOTE (updated 3/23)      Date: 2024               Patient Name:  Edward Ordonez :  1955   Medical   Diagnosis:  Pain in right knee [M25.561]  Pain in left knee [M25.562] Treatment Diagnosis:  M25.561  RIGHT KNEE PAIN and M25.562  LEFT KNEE PAIN    Referral Source:  Maninder Osei MD Insurance:   Payor: HUMANA MEDICARE / Plan: HUMANA GOLD PLUS HMO / Product Type: *No Product type* /                              Patient  verified yes     Visit #   Current  / Total 10R/8L 16   Time   In / Out 1400 245   Total Treatment Time 45   Total Timed Codes 45   1:1 Treatment Time 45      [facility] Totals Reminder:  bill using total billable   min of TIMED therapeutic procedures and modalities.   8-22 min = 1 unit; 23-37 min = 2 units; 38-52 min = 3 units; 53-67 min = 4 units; 68-82 min = 5 units            SUBJECTIVE     Pain Level (0-10 scale): R 0, L 4/10     Any medication changes, allergies to medications, adverse drug reactions, diagnosis change, or new procedure performed?: [x] No    [] Yes (see summary sheet for update)  Medications: Verified on Patient Summary List     Subjective functional status/changes:     R TKR , L TKR   Increased L knee pain today     OBJECTIVE        Therapeutic Procedures:  Tx Min Billable or 1:1 Min (if diff from Tx Min) Procedure, Rationale, Specifics   45  45   99481 Therapeutic Exercise (timed):  increase ROM, strength, coordination, balance, and proprioception to improve patient's ability to progress to PLOF and address remaining functional goals. (see flow sheet as applicable)      Details if applicable:       Nu-step 10',L1  QS 2 x 10  Heel drop 3 x 30\"    Heel slides with therapist assistance 2x10  Sitting hamstring stretch 3x 30\"  Sidelying knee flexion stretch LLE  Sitting on edge of mat LLE stretch   supine LE wall slides knee extension and flexion   45 45     Total Total           [x]  Patient Education billed

## 2024-02-22 ENCOUNTER — HOSPITAL ENCOUNTER (OUTPATIENT)
Facility: HOSPITAL | Age: 69
Setting detail: RECURRING SERIES
Discharge: HOME | End: 2024-02-25
Payer: MEDICARE

## 2024-02-22 PROCEDURE — 97110 THERAPEUTIC EXERCISES: CPT

## 2024-02-22 NOTE — PROGRESS NOTES
demonstrate L knee flexion ROM up to 130deg independently for improved functional ROM.  2.  Pt will ambulate community level distances without device, normalized gait, and limited pain for improved tolerance to daily tasks.  3.  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     PLAN  Yes  Continue plan of care  Re-Cert Due: L 4/25,R 4/11  [x]  Upgrade activities as tolerated  []  Discharge due to :  []  Other:          Paulino Braun, GRAHAM       2/22/2024       5:50 PM

## 2024-02-23 ENCOUNTER — OFFICE VISIT (OUTPATIENT)
Dept: FAMILY MEDICINE CLINIC | Age: 69
End: 2024-02-23

## 2024-02-23 VITALS
RESPIRATION RATE: 16 BRPM | BODY MASS INDEX: 26.48 KG/M2 | WEIGHT: 185 LBS | HEIGHT: 70 IN | SYSTOLIC BLOOD PRESSURE: 129 MMHG | TEMPERATURE: 97 F | DIASTOLIC BLOOD PRESSURE: 75 MMHG | HEART RATE: 100 BPM | OXYGEN SATURATION: 97 %

## 2024-02-23 DIAGNOSIS — F51.01 PRIMARY INSOMNIA: ICD-10-CM

## 2024-02-23 DIAGNOSIS — Z00.00 MEDICARE ANNUAL WELLNESS VISIT, SUBSEQUENT: Primary | ICD-10-CM

## 2024-02-23 DIAGNOSIS — I10 ESSENTIAL HYPERTENSION: ICD-10-CM

## 2024-02-23 RX ORDER — MIRTAZAPINE 7.5 MG/1
7.5 TABLET, FILM COATED ORAL NIGHTLY
Qty: 30 TABLET | Refills: 3 | Status: SHIPPED | OUTPATIENT
Start: 2024-02-23

## 2024-02-23 RX ORDER — LOSARTAN POTASSIUM 100 MG/1
100 TABLET ORAL DAILY
Qty: 90 TABLET | Refills: 1 | Status: SHIPPED | OUTPATIENT
Start: 2024-02-23

## 2024-02-23 ASSESSMENT — PATIENT HEALTH QUESTIONNAIRE - PHQ9
SUM OF ALL RESPONSES TO PHQ QUESTIONS 1-9: 0
SUM OF ALL RESPONSES TO PHQ QUESTIONS 1-9: 0
2. FEELING DOWN, DEPRESSED OR HOPELESS: 0
SUM OF ALL RESPONSES TO PHQ9 QUESTIONS 1 & 2: 0
1. LITTLE INTEREST OR PLEASURE IN DOING THINGS: 0
SUM OF ALL RESPONSES TO PHQ QUESTIONS 1-9: 0
SUM OF ALL RESPONSES TO PHQ QUESTIONS 1-9: 0

## 2024-02-23 ASSESSMENT — LIFESTYLE VARIABLES
HOW OFTEN DO YOU HAVE A DRINK CONTAINING ALCOHOL: NEVER
HOW MANY STANDARD DRINKS CONTAINING ALCOHOL DO YOU HAVE ON A TYPICAL DAY: PATIENT DOES NOT DRINK

## 2024-02-23 NOTE — PROGRESS NOTES
\"Have you been to the ER, urgent care clinic since your last visit?  Hospitalized since your last visit?\"    NO    “Have you seen or consulted any other health care providers outside of [facility] since your last visit?”    NO

## 2024-02-23 NOTE — PROGRESS NOTES
2/23/2024      Chief Complaint   Patient presents with    Medicare [facility]         History of Present Illness:         is a 69 y.o. male to follow up HTN. Rehab on L knee much slower than R. Pain is improved. Mirtazapine helpful for sleep.  No Known Allergies    Current Outpatient Medications   Medication Sig Dispense Refill    mirtazapine (REMERON) 7.5 MG tablet Take 1 tablet by mouth nightly 30 tablet 3    losartan (COZAAR) 100 MG tablet Take 1 tablet by mouth daily 90 tablet 1    aspirin 81 MG chewable tablet Take 1 tablet by mouth 2 times daily 30 tablet 3    ibuprofen (ADVIL;MOTRIN) 800 MG tablet Take 1 tablet by mouth every 6 hours as needed for Pain (Patient not taking: Reported on 2/23/2024) 120 tablet 3     No current facility-administered medications for this visit.             Physical Examination:    /75 (Site: Left Upper Arm, Position: Sitting, Cuff Size: Medium Adult)   Pulse 100   Temp 97 °F (36.1 °C) (Oral)   Resp 16   Ht 1.778 m (5' 10\")   Wt 83.9 kg (185 lb)   SpO2 97%   BMI 26.54 kg/m²    General:  Alert, cooperative, no distress.   HEENT:  Normocephalic, without obvious abnormality, atraumatic.Conjunctivae/corneas clear. Pupils equal, round, reactive to light. Extraocular movements intact.TMs and external canals normal bilaterally. Nasal mucosa and oropharynx clear.   Lungs: Clear to auscultation bilaterally.   Chest wall:  No tenderness or deformity.   Heart:  Regular rate and rhythm, S1, S2 normal, no murmur, click, rub, or gallop.   Abdomen:   Soft, non-tender. Bowel sounds normal. No masses. No organomegaly.   Extremities: Extremities normal, atraumatic, no cyanosis or edema.   Pulses: 2+ and symmetric all extremities.   Skin: Skin color, texture, turgor normal. No rashes or lesions.   Lymph nodes: Cervical, supraclavicular, and axillary nodes normal.   Neurologic: CNII-XII intact. Normal strength, sensation, and reflexes throughout.         ASSESSMENT AND PLAN    1. Medicare

## 2024-02-23 NOTE — PROGRESS NOTES
Medicare Annual Wellness Visit    Edward Ordonez is here for Medicare AWV    Assessment & Plan   Medicare annual wellness visit, subsequent  Recommendations for Preventive Services Due: see orders and patient instructions/AVS.  Recommended screening schedule for the next 5-10 years is provided to the patient in written form: see Patient Instructions/AVS.     No follow-ups on file.     Subjective       Patient's complete Health Risk Assessment and screening values have been reviewed and are found in Flowsheets. The following problems were reviewed today and where indicated follow up appointments were made and/or referrals ordered.    Positive Risk Factor Screenings with Interventions:                   Vision Screen:  Do you have difficulty driving, watching TV, or doing any of your daily activities because of your eyesight?: No  Have you had an eye exam within the past year?: (!) No  No results found.    Interventions:   Patient encouraged to make appointment with their eye specialist                    Objective   Vitals:    02/23/24 0951   BP: 129/75   Site: Left Upper Arm   Position: Sitting   Cuff Size: Medium Adult   Pulse: 100   Resp: 16   Temp: 97 °F (36.1 °C)   TempSrc: Oral   SpO2: 97%   Weight: 83.9 kg (185 lb)   Height: 1.778 m (5' 10\")      Body mass index is 26.54 kg/m².               No Known Allergies  Prior to Visit Medications    Medication Sig Taking? Authorizing Provider   aspirin 81 MG chewable tablet Take 1 tablet by mouth 2 times daily Yes Maninder Osei MD   losartan (COZAAR) 100 MG tablet Take 1 tablet by mouth daily Yes Domo Max Jr., MD   mirtazapine (REMERON) 7.5 MG tablet Take 1 tablet by mouth nightly  Patient not taking: Reported on 2/23/2024  Antonio Griffin MD   ibuprofen (ADVIL;MOTRIN) 800 MG tablet Take 1 tablet by mouth every 6 hours as needed for Pain  Patient not taking: Reported on 2/23/2024  Maninder Osei MD       [facility] (Including outside providers/suppliers

## 2024-02-27 ENCOUNTER — HOSPITAL ENCOUNTER (OUTPATIENT)
Facility: HOSPITAL | Age: 69
Setting detail: RECURRING SERIES
Discharge: HOME | End: 2024-03-01
Payer: MEDICARE

## 2024-02-27 PROCEDURE — 97110 THERAPEUTIC EXERCISES: CPT

## 2024-02-27 NOTE — PROGRESS NOTES
[facility]  [address] [place] [address]  Office [phone]  Fax [phone]   DISCHARGE SUMMARY  Patient Name: Edward Ordonez : 1955   Treatment/Medical Diagnosis: Pain in right knee [M25.561]  Pain in left knee [M25.562]   Referral Source: Maninder Osei MD     Date of Initial Visit: R 2024  L 2024 Attended Visits: R=12  L=10 Missed Visits: 0     SUMMARY OF TREATMENT  Mr Ordonez was treated for Bilateral knee pain after TKR (R 2024 *& L 2024)  CURRENT STATUS  Mr Ordonez has progressed to a comfortable point for his D/c.. Right knee has recovered well and pt notes feel 90% better.  Left knee was reported to be ~40% better, as pain and limited ROM. R Knee AAROM = 2-108 degrees. L Knee AAROM= 8-90 degrees. Pt has been able to advance to ambulation without and device. Gait is antalgic due to lack of full extension and L knee pain. After recent ortho F/u, pt feels that he wont not make desired progress in near future.He declined transition to Maintenance program. He plans to continue exercise at his home and asked to be D/C from skilled PT  R Knee goals  Short Term Goals: To be accomplished in 8 treatments   Patient will be independent in a HEP to increase his mobility.Met  AROM R knee flexion 0-90 to assist with functional activities Not Met   Patient will return to driving.NOT Met  Patient will ambulate with a cane or without a device independently.Met  Patient will report his worst pain is a 4/10 with activities.Met  Long Term Goals: To be accomplished in 16 treatments   AROM R knee flexion 0-115 to assist with functional activities.Not Met   Patient will return to prior activities without pain.Met   Patient will ambulate without a device and without gait deviations.Not Met      L knee goals  Short Term Goals: To be accomplished in 8 treatments  Pt will demonstrate L knee flexion up to 90deg independently for improved

## 2024-02-27 NOTE — PROGRESS NOTES
PHYSICAL THERAPY - MEDICARE DAILY TREATMENT NOTE (updated 3/23)      Date: 2024               Patient Name:  Edward Ordonez :  1955   Medical   Diagnosis:  Pain in right knee [M25.561]  Pain in left knee [M25.562] Treatment Diagnosis:  M25.561  RIGHT KNEE PAIN and M25.562  LEFT KNEE PAIN    Referral Source:  Maninder Osei MD Insurance:   Payor: [employer] MEDICARE / Plan: [employer] GOLD PLUS HMO / Product Type: *No Product type* /                              Patient  verified yes     Visit #   Current  / Total 12R/10L 16   Time   In / Out 1045 1130   Total Treatment Time 45   Total Timed Codes 45   1:1 Treatment Time 45       BC Totals Reminder:  bill using total billable   min of TIMED therapeutic procedures and modalities.   8-22 min = 1 unit; 23-37 min = 2 units; 38-52 min = 3 units; 53-67 min = 4 units; 68-82 min = 5 units            SUBJECTIVE     Pain Level (0-10 scale): R 0, L 4/10     Any medication changes, allergies to medications, adverse drug reactions, diagnosis change, or new procedure performed?: [x] No    [] Yes (see summary sheet for update)  Medications: Verified on Patient Summary List      OBJECTIVE   R knee is good. He said L knee had a lot of work done to it.      Therapeutic Procedures:  Tx Min Billable or 1:1 Min (if diff from Tx Min) Procedure, Rationale, Specifics   45  45   [id number] Therapeutic Exercise (timed):  increase ROM, strength, coordination, balance, and proprioception to improve patient's ability to progress to PLOF and address remaining functional goals. (see flow sheet as applicable)      Details if applicable:       Bike (Rocking) 6 min   Stool scoot x 3   LAQ x 30  Plank 6 x 10 \"  Leg press 140# 2 x10  LP SLS 70# 2 x 10   45 45     Total Total           [x]  Patient Education billed concurrently with other procedures   [x] Review HEP    [] Progressed/Changed HEP, detail:    [] Other detail:           Other Objective/Functional Measures  AAROM   R= 2-108

## 2024-03-26 ENCOUNTER — TELEPHONE (OUTPATIENT)
Dept: FAMILY MEDICINE CLINIC | Age: 69
End: 2024-03-26

## 2024-03-26 NOTE — TELEPHONE ENCOUNTER
----- Message from Promise Lebron sent at 3/26/2024 10:52 AM EDT -----  Subject: Message to Provider    QUESTIONS  Information for Provider? Pt requesting a letter stating that he has   cancer on his neck and nose, Pt would like to have his tint darkened on   his car and was told by a  he would need a letter from his   doctor. Pt stated his Dermatologist would not provide this letter for him   and was told to reach out to his PCP.   ---------------------------------------------------------------------------  --------------  CALL BACK INFO  [phone]; OK to leave message on voicemail  ---------------------------------------------------------------------------  --------------  SCRIPT ANSWERS  Relationship to Patient? Self

## 2024-03-26 NOTE — TELEPHONE ENCOUNTER
Patient returns my call.  Per Dr Griffin, I have advised him that Dr Griffin will not write him this letter.  Patient verbalizes understanding.

## 2024-08-23 ENCOUNTER — OFFICE VISIT (OUTPATIENT)
Dept: FAMILY MEDICINE CLINIC | Age: 69
End: 2024-08-23

## 2024-08-23 VITALS
SYSTOLIC BLOOD PRESSURE: 137 MMHG | DIASTOLIC BLOOD PRESSURE: 82 MMHG | HEIGHT: 70 IN | RESPIRATION RATE: 16 BRPM | WEIGHT: 198 LBS | OXYGEN SATURATION: 97 % | HEART RATE: 77 BPM | TEMPERATURE: 96.9 F | BODY MASS INDEX: 28.35 KG/M2

## 2024-08-23 DIAGNOSIS — I10 ESSENTIAL HYPERTENSION: ICD-10-CM

## 2024-08-23 DIAGNOSIS — F51.01 PRIMARY INSOMNIA: ICD-10-CM

## 2024-08-23 DIAGNOSIS — F33.0 MAJOR DEPRESSIVE DISORDER, RECURRENT, MILD (HCC): ICD-10-CM

## 2024-08-23 DIAGNOSIS — Z23 ENCOUNTER FOR IMMUNIZATION: Primary | ICD-10-CM

## 2024-08-23 RX ORDER — MIRTAZAPINE 7.5 MG/1
7.5 TABLET, FILM COATED ORAL NIGHTLY
Qty: 90 TABLET | Refills: 1 | Status: SHIPPED | OUTPATIENT
Start: 2024-08-23

## 2024-08-23 RX ORDER — LOSARTAN POTASSIUM 100 MG/1
100 TABLET ORAL DAILY
Qty: 90 TABLET | Refills: 1 | Status: SHIPPED | OUTPATIENT
Start: 2024-08-23

## 2024-08-23 SDOH — ECONOMIC STABILITY: FOOD INSECURITY: WITHIN THE PAST 12 MONTHS, THE FOOD YOU BOUGHT JUST DIDN'T LAST AND YOU DIDN'T HAVE MONEY TO GET MORE.: NEVER TRUE

## 2024-08-23 SDOH — ECONOMIC STABILITY: FOOD INSECURITY: WITHIN THE PAST 12 MONTHS, YOU WORRIED THAT YOUR FOOD WOULD RUN OUT BEFORE YOU GOT MONEY TO BUY MORE.: NEVER TRUE

## 2024-08-23 SDOH — ECONOMIC STABILITY: INCOME INSECURITY: HOW HARD IS IT FOR YOU TO PAY FOR THE VERY BASICS LIKE FOOD, HOUSING, MEDICAL CARE, AND HEATING?: NOT HARD AT ALL

## 2024-08-23 ASSESSMENT — PATIENT HEALTH QUESTIONNAIRE - PHQ9
SUM OF ALL RESPONSES TO PHQ QUESTIONS 1-9: 0
SUM OF ALL RESPONSES TO PHQ QUESTIONS 1-9: 0
1. LITTLE INTEREST OR PLEASURE IN DOING THINGS: NOT AT ALL
SUM OF ALL RESPONSES TO PHQ QUESTIONS 1-9: 0
SUM OF ALL RESPONSES TO PHQ9 QUESTIONS 1 & 2: 0
2. FEELING DOWN, DEPRESSED OR HOPELESS: NOT AT ALL
SUM OF ALL RESPONSES TO PHQ QUESTIONS 1-9: 0

## 2024-08-23 NOTE — PROGRESS NOTES
\"Have you been to the ER, urgent care clinic since your last visit?  Hospitalized since your last vi//sit?\"    Yes -8/4/24 - [facility] - Pain      “Have you seen or consulted any other health care providers outside of [facility] since your last visit?”    NO      8/23/2024      Chief Complaint   Patient presents with    Hypertension     6 month follow up     Knee Pain         History of Present Illness:         is a 69 y.o. male returns feeling well, still not entirely happy with his new knees, still gets some pain and swelling especially on L. Found mirtazapine helpful for sleep but worried about using it regularly. No nocturia. Not checking BP at home.      No Known Allergies    Current Outpatient Medications   Medication Sig Dispense Refill    mirtazapine (REMERON) 7.5 MG tablet Take 1 tablet by mouth nightly 90 tablet 1    losartan (COZAAR) 100 MG tablet Take 1 tablet by mouth daily 90 tablet 1    aspirin 81 MG chewable tablet Take 1 tablet by mouth 2 times daily 30 tablet 3    ibuprofen (ADVIL;MOTRIN) 800 MG tablet Take 1 tablet by mouth every 6 hours as needed for Pain (Patient not taking: Reported on 2/23/2024) 120 tablet 3     No current facility-administered medications for this visit.             Physical Examination:    BP (!) 147/72 (Site: Left Upper Arm, Position: Sitting, Cuff Size: Medium Adult)   Pulse 77   Temp 96.9 °F (36.1 °C) (Oral)   Resp 16   Ht 1.778 m (5' 10\")   Wt 89.8 kg (198 lb)   SpO2 97%   BMI 28.41 kg/m²    General:  Alert, cooperative, no distress.   HEENT:  Normocephalic, without obvious abnormality, atraumatic.Conjunctivae/corneas clear. Pupils equal, round, reactive to light. Extraocular movements intact.TMs and external canals normal bilaterally. Nasal mucosa and oropharynx clear.   Lungs: Clear to auscultation bilaterally.   Chest wall:  No tenderness or deformity.   Heart:  Regular rate and rhythm, S1, S2 normal, no murmur, click, rub, or

## 2024-11-25 ENCOUNTER — OFFICE VISIT (OUTPATIENT)
Dept: FAMILY MEDICINE CLINIC | Age: 69
End: 2024-11-25
Payer: MEDICARE

## 2024-11-25 VITALS
OXYGEN SATURATION: 98 % | HEIGHT: 70 IN | RESPIRATION RATE: 16 BRPM | HEART RATE: 90 BPM | TEMPERATURE: 97.7 F | DIASTOLIC BLOOD PRESSURE: 83 MMHG | BODY MASS INDEX: 29.78 KG/M2 | WEIGHT: 208 LBS | SYSTOLIC BLOOD PRESSURE: 163 MMHG

## 2024-11-25 DIAGNOSIS — J20.9 ACUTE BRONCHITIS DUE TO INFECTION: Primary | ICD-10-CM

## 2024-11-25 PROCEDURE — G8427 DOCREV CUR MEDS BY ELIG CLIN: HCPCS | Performed by: FAMILY MEDICINE

## 2024-11-25 PROCEDURE — 3077F SYST BP >= 140 MM HG: CPT | Performed by: FAMILY MEDICINE

## 2024-11-25 PROCEDURE — 1036F TOBACCO NON-USER: CPT | Performed by: FAMILY MEDICINE

## 2024-11-25 PROCEDURE — G8419 CALC BMI OUT NRM PARAM NOF/U: HCPCS | Performed by: FAMILY MEDICINE

## 2024-11-25 PROCEDURE — 99213 OFFICE O/P EST LOW 20 MIN: CPT | Performed by: FAMILY MEDICINE

## 2024-11-25 PROCEDURE — 1123F ACP DISCUSS/DSCN MKR DOCD: CPT | Performed by: FAMILY MEDICINE

## 2024-11-25 PROCEDURE — 3079F DIAST BP 80-89 MM HG: CPT | Performed by: FAMILY MEDICINE

## 2024-11-25 PROCEDURE — G8484 FLU IMMUNIZE NO ADMIN: HCPCS | Performed by: FAMILY MEDICINE

## 2024-11-25 PROCEDURE — 3017F COLORECTAL CA SCREEN DOC REV: CPT | Performed by: FAMILY MEDICINE

## 2024-11-25 PROCEDURE — 1159F MED LIST DOCD IN RCRD: CPT | Performed by: FAMILY MEDICINE

## 2024-11-25 PROCEDURE — 1126F AMNT PAIN NOTED NONE PRSNT: CPT | Performed by: FAMILY MEDICINE

## 2024-11-25 RX ORDER — BENZONATATE 100 MG/1
100 CAPSULE ORAL 3 TIMES DAILY PRN
Qty: 30 CAPSULE | Refills: 0 | Status: SHIPPED | OUTPATIENT
Start: 2024-11-25 | End: 2024-12-05

## 2024-11-25 RX ORDER — DOXYCYCLINE HYCLATE 100 MG
100 TABLET ORAL 2 TIMES DAILY
Qty: 20 TABLET | Refills: 0 | Status: SHIPPED | OUTPATIENT
Start: 2024-11-25 | End: 2024-12-05

## 2024-11-25 ASSESSMENT — PATIENT HEALTH QUESTIONNAIRE - PHQ9
SUM OF ALL RESPONSES TO PHQ QUESTIONS 1-9: 0
SUM OF ALL RESPONSES TO PHQ QUESTIONS 1-9: 0
SUM OF ALL RESPONSES TO PHQ9 QUESTIONS 1 & 2: 0
1. LITTLE INTEREST OR PLEASURE IN DOING THINGS: NOT AT ALL
SUM OF ALL RESPONSES TO PHQ QUESTIONS 1-9: 0
SUM OF ALL RESPONSES TO PHQ QUESTIONS 1-9: 0
2. FEELING DOWN, DEPRESSED OR HOPELESS: NOT AT ALL

## 2024-11-25 NOTE — PROGRESS NOTES
\"Have you been to the ER, urgent care clinic since your last visit?  Hospitalized since your last visit?\"    NO    “Have you seen or consulted any other health care providers outside our system since your last visit?”    NO  11/25/2024      Chief Complaint   Patient presents with    Nasal Congestion    Cough     X 7 days          History of Present Illness:         is a 69 y.o. male who developed fever and ST about a week ago, since has had productive cough, but no fever in last three days. Still having significant bilateral knee pain post replacement ten months ago.      No Known Allergies    Current Outpatient Medications   Medication Sig Dispense Refill    doxycycline hyclate (VIBRA-TABS) 100 MG tablet Take 1 tablet by mouth 2 times daily for 10 days 20 tablet 0    benzonatate (TESSALON) 100 MG capsule Take 1 capsule by mouth 3 times daily as needed for Cough 30 capsule 0    mirtazapine (REMERON) 7.5 MG tablet Take 1 tablet by mouth nightly 90 tablet 1    losartan (COZAAR) 100 MG tablet Take 1 tablet by mouth daily 90 tablet 1    aspirin 81 MG chewable tablet Take 1 tablet by mouth 2 times daily 30 tablet 3    ibuprofen (ADVIL;MOTRIN) 800 MG tablet Take 1 tablet by mouth every 6 hours as needed for Pain (Patient not taking: Reported on 2/23/2024) 120 tablet 3     No current facility-administered medications for this visit.             Physical Examination:    BP (!) 163/83 (Site: Left Upper Arm, Position: Sitting, Cuff Size: Medium Adult)   Pulse 90   Temp 97.7 °F (36.5 °C) (Oral)   Resp 16   Ht 1.778 m (5' 10\")   Wt 94.3 kg (208 lb)   SpO2 98%   BMI 29.84 kg/m²    General:  Alert, cooperative, no distress.   HEENT:  Normocephalic, without obvious abnormality, atraumatic.Conjunctivae/corneas clear. Pupils equal, round, reactive to light. Extraocular movements intact.TMs and external canals normal bilaterally. Nasal mucosa and oropharynx clear.   Lungs: Coarse crackles centrally bilaterally   Chest

## 2025-01-14 ENCOUNTER — OFFICE VISIT (OUTPATIENT)
Dept: FAMILY MEDICINE CLINIC | Age: 70
End: 2025-01-14

## 2025-01-14 VITALS
RESPIRATION RATE: 16 BRPM | HEART RATE: 76 BPM | SYSTOLIC BLOOD PRESSURE: 135 MMHG | DIASTOLIC BLOOD PRESSURE: 72 MMHG | OXYGEN SATURATION: 98 % | TEMPERATURE: 97.2 F | BODY MASS INDEX: 30.21 KG/M2 | WEIGHT: 211 LBS | HEIGHT: 70 IN

## 2025-01-14 DIAGNOSIS — Z00.00 MEDICARE ANNUAL WELLNESS VISIT, SUBSEQUENT: Primary | ICD-10-CM

## 2025-01-14 DIAGNOSIS — E78.00 HYPERCHOLESTEROLEMIA: ICD-10-CM

## 2025-01-14 DIAGNOSIS — N40.0 BENIGN PROSTATIC HYPERPLASIA WITHOUT LOWER URINARY TRACT SYMPTOMS: ICD-10-CM

## 2025-01-14 DIAGNOSIS — Z23 ENCOUNTER FOR IMMUNIZATION: ICD-10-CM

## 2025-01-14 DIAGNOSIS — I10 ESSENTIAL (PRIMARY) HYPERTENSION: ICD-10-CM

## 2025-01-14 RX ORDER — HYDROCHLOROTHIAZIDE 12.5 MG/1
12.5 CAPSULE ORAL EVERY MORNING
Qty: 60 CAPSULE | Refills: 0 | Status: SHIPPED | OUTPATIENT
Start: 2025-01-14

## 2025-01-14 RX ORDER — LOSARTAN POTASSIUM AND HYDROCHLOROTHIAZIDE 12.5; 1 MG/1; MG/1
1 TABLET ORAL DAILY
Qty: 90 TABLET | Refills: 1 | Status: SHIPPED | OUTPATIENT
Start: 2025-01-14

## 2025-01-14 SDOH — ECONOMIC STABILITY: FOOD INSECURITY: WITHIN THE PAST 12 MONTHS, YOU WORRIED THAT YOUR FOOD WOULD RUN OUT BEFORE YOU GOT MONEY TO BUY MORE.: NEVER TRUE

## 2025-01-14 SDOH — ECONOMIC STABILITY: FOOD INSECURITY: WITHIN THE PAST 12 MONTHS, THE FOOD YOU BOUGHT JUST DIDN'T LAST AND YOU DIDN'T HAVE MONEY TO GET MORE.: NEVER TRUE

## 2025-01-14 ASSESSMENT — PATIENT HEALTH QUESTIONNAIRE - PHQ9
10. IF YOU CHECKED OFF ANY PROBLEMS, HOW DIFFICULT HAVE THESE PROBLEMS MADE IT FOR YOU TO DO YOUR WORK, TAKE CARE OF THINGS AT HOME, OR GET ALONG WITH OTHER PEOPLE: NOT DIFFICULT AT ALL
SUM OF ALL RESPONSES TO PHQ QUESTIONS 1-9: 1
6. FEELING BAD ABOUT YOURSELF - OR THAT YOU ARE A FAILURE OR HAVE LET YOURSELF OR YOUR FAMILY DOWN: NOT AT ALL
7. TROUBLE CONCENTRATING ON THINGS, SUCH AS READING THE NEWSPAPER OR WATCHING TELEVISION: NOT AT ALL
SUM OF ALL RESPONSES TO PHQ QUESTIONS 1-9: 1
9. THOUGHTS THAT YOU WOULD BE BETTER OFF DEAD, OR OF HURTING YOURSELF: NOT AT ALL
3. TROUBLE FALLING OR STAYING ASLEEP: NOT AT ALL
2. FEELING DOWN, DEPRESSED OR HOPELESS: NOT AT ALL
SUM OF ALL RESPONSES TO PHQ QUESTIONS 1-9: 1
1. LITTLE INTEREST OR PLEASURE IN DOING THINGS: NOT AT ALL
SUM OF ALL RESPONSES TO PHQ QUESTIONS 1-9: 1
5. POOR APPETITE OR OVEREATING: NOT AT ALL
SUM OF ALL RESPONSES TO PHQ9 QUESTIONS 1 & 2: 0
8. MOVING OR SPEAKING SO SLOWLY THAT OTHER PEOPLE COULD HAVE NOTICED. OR THE OPPOSITE, BEING SO FIGETY OR RESTLESS THAT YOU HAVE BEEN MOVING AROUND A LOT MORE THAN USUAL: NOT AT ALL
4. FEELING TIRED OR HAVING LITTLE ENERGY: SEVERAL DAYS

## 2025-01-14 ASSESSMENT — LIFESTYLE VARIABLES
HOW OFTEN DO YOU HAVE A DRINK CONTAINING ALCOHOL: 2-3 TIMES A WEEK
HOW MANY STANDARD DRINKS CONTAINING ALCOHOL DO YOU HAVE ON A TYPICAL DAY: 1 OR 2

## 2025-01-14 NOTE — PATIENT INSTRUCTIONS
you care for yourself at home?  Diet    Use less salt when you cook and eat. This helps lower your blood pressure. Taste food before salting. Add only a little salt when you think you need it. With time, your taste buds will adjust to less salt.     Eat fewer snack items, fast foods, canned soups, and other high-salt, high-fat, processed foods.     Read food labels and try to avoid saturated and trans fats. They increase your risk of heart disease by raising cholesterol levels.     Limit the amount of solid fat--butter, margarine, and shortening--you eat. Use olive, peanut, or canola oil when you cook. Bake, broil, and steam foods instead of frying them.     Eat a variety of fruit and vegetables every day. Dark green, deep orange, red, or yellow fruits and vegetables are especially good for you. Examples include spinach, carrots, peaches, and berries.     Foods high in fiber can reduce your cholesterol and provide important vitamins and minerals. High-fiber foods include whole-grain cereals and breads, oatmeal, beans, brown rice, citrus fruits, and apples.     Eat lean proteins. Heart-healthy proteins include seafood, lean meats and poultry, eggs, beans, peas, nuts, seeds, and soy products.     Limit drinks and foods with added sugar. These include candy, desserts, and soda pop.   Heart-healthy lifestyle    If your doctor recommends it, get more exercise. For many people, walking is a good choice. Or you may want to swim, bike, or do other activities. Bit by bit, increase the time you're active every day. Try for at least 30 minutes on most days of the week.     Try to quit or cut back on using tobacco and other nicotine products. This includes smoking and vaping. If you need help quitting, talk to your doctor about stop-smoking programs and medicines. These can increase your chances of quitting for good. Quitting is one of the most important things you can do to protect your heart. It is never too late to quit. Try

## 2025-01-14 NOTE — PROGRESS NOTES
Medicare Annual Wellness Visit    Edward Ordonez is here for Medicare AWV, Hypertension, and Knee Pain (Left )    Assessment & Plan   Medicare annual wellness visit, subsequent     No follow-ups on file.     Subjective       Patient's complete Health Risk Assessment and screening values have been reviewed and are found in Flowsheets. The following problems were reviewed today and where indicated follow up appointments were made and/or referrals ordered.    Positive Risk Factor Screenings with Interventions:                Abnormal BMI (obese):  Body mass index is 30.28 kg/m². (!) Abnormal  Interventions:  Patient advised to follow-up in this office for further evaluation and treatment           Safety:  Do you have non-slip mats or non-slip surfaces or shower bars or grab bars in your shower or bathtub?: (!) No  Interventions:  Patient declined any further interventions or treatment                   Objective   Vitals:    01/14/25 0736   Weight: 95.7 kg (211 lb)      Body mass index is 30.28 kg/m².                  No Known Allergies  Prior to Visit Medications    Medication Sig Taking? Authorizing Provider   mirtazapine (REMERON) 7.5 MG tablet Take 1 tablet by mouth nightly Yes Antonio Griffin MD   losartan (COZAAR) 100 MG tablet Take 1 tablet by mouth daily Yes Antonio Griffin MD   aspirin 81 MG chewable tablet Take 1 tablet by mouth 2 times daily Yes Maninder Osei MD   ibuprofen (ADVIL;MOTRIN) 800 MG tablet Take 1 tablet by mouth every 6 hours as needed for Pain Yes Maninder Osei MD       [facility] ([facility]cluding outside providers/suppliers regularly involved in providing care):   Patient Care Team:  Antonio Griffin MD as PCP - General (Family Medicine)  Antonio Griffin MD as PCP - Empaneled Provider     Recommendations for Preventive Services Due: see orders and patient instructions/AVS.  Recommended screening schedule for the next 5-10 years is provided to the patient in written form: see 
Successful venipuncture in patient's Left AC X 1 sticks.  The patient tolerated this procedure w/o c/o pain or discomfort.  
\"Have you been to the ER, urgent care clinic since your last visit?  Hospitalized since your last visit?\"    NO    “Have you seen or consulted any other health care providers outside our system since your last visit?”    NO           
          Antonio Griffin MD

## 2025-01-15 LAB
ALBUMIN SERPL-MCNC: 4 G/DL (ref 3.5–5)
ALBUMIN/GLOB SERPL: 1.5 (ref 1.1–2.2)
ALP SERPL-CCNC: 52 U/L (ref 45–117)
ALT SERPL-CCNC: 26 U/L (ref 12–78)
ANION GAP SERPL CALC-SCNC: 6 MMOL/L (ref 2–12)
AST SERPL-CCNC: 26 U/L (ref 15–37)
BASOPHILS # BLD: 0.02 K/UL (ref 0–0.1)
BASOPHILS NFR BLD: 0.5 % (ref 0–1)
BILIRUB SERPL-MCNC: 1 MG/DL (ref 0.2–1)
BUN SERPL-MCNC: 14 MG/DL (ref 6–20)
BUN/CREAT SERPL: 15 (ref 12–20)
CALCIUM SERPL-MCNC: 8.9 MG/DL (ref 8.5–10.1)
CHLORIDE SERPL-SCNC: 109 MMOL/L (ref 97–108)
CHOLEST SERPL-MCNC: 208 MG/DL
CO2 SERPL-SCNC: 25 MMOL/L (ref 21–32)
CREAT SERPL-MCNC: 0.94 MG/DL (ref 0.7–1.3)
DIFFERENTIAL METHOD BLD: ABNORMAL
EOSINOPHIL # BLD: 0.07 K/UL (ref 0–0.4)
EOSINOPHIL NFR BLD: 1.8 % (ref 0–7)
ERYTHROCYTE [DISTWIDTH] IN BLOOD BY AUTOMATED COUNT: 13.4 % (ref 11.5–14.5)
GLOBULIN SER CALC-MCNC: 2.7 G/DL (ref 2–4)
GLUCOSE SERPL-MCNC: 94 MG/DL (ref 65–100)
HCT VFR BLD AUTO: 40 % (ref 36.6–50.3)
HDLC SERPL-MCNC: 82 MG/DL
HDLC SERPL: 2.5 (ref 0–5)
HGB BLD-MCNC: 13.7 G/DL (ref 12.1–17)
IMM GRANULOCYTES # BLD AUTO: 0.02 K/UL (ref 0–0.04)
IMM GRANULOCYTES NFR BLD AUTO: 0.5 % (ref 0–0.5)
LDLC SERPL CALC-MCNC: 110.8 MG/DL (ref 0–100)
LYMPHOCYTES # BLD: 1.39 K/UL (ref 0.8–3.5)
LYMPHOCYTES NFR BLD: 36 % (ref 12–49)
MCH RBC QN AUTO: 31.6 PG (ref 26–34)
MCHC RBC AUTO-ENTMCNC: 34.3 G/DL (ref 30–36.5)
MCV RBC AUTO: 92.2 FL (ref 80–99)
MONOCYTES # BLD: 0.37 K/UL (ref 0–1)
MONOCYTES NFR BLD: 9.6 % (ref 5–13)
NEUTS SEG # BLD: 1.99 K/UL (ref 1.8–8)
NEUTS SEG NFR BLD: 51.6 % (ref 32–75)
NRBC # BLD: 0 K/UL (ref 0–0.01)
NRBC BLD-RTO: 0 PER 100 WBC
PLATELET # BLD AUTO: 160 K/UL (ref 150–400)
POTASSIUM SERPL-SCNC: 4.2 MMOL/L (ref 3.5–5.1)
PROT SERPL-MCNC: 6.7 G/DL (ref 6.4–8.2)
RBC # BLD AUTO: 4.34 M/UL (ref 4.1–5.7)
SODIUM SERPL-SCNC: 140 MMOL/L (ref 136–145)
TRIGL SERPL-MCNC: 76 MG/DL
VLDLC SERPL CALC-MCNC: 15.2 MG/DL
WBC # BLD AUTO: 3.9 K/UL (ref 4.1–11.1)

## 2025-01-17 LAB
PSA FREE MFR SERPL: 27.9 %
PSA FREE SERPL-MCNC: 0.53 NG/ML
PSA SERPL-MCNC: 1.9 NG/ML (ref 0–4)

## 2025-02-04 NOTE — PROGRESS NOTES
Method of communication:  []Fax [x]Phone call []In person   []Other:    Who is making request:pt  What medication/s (include strength and dosing):  Lipitor 20mg    This is for a:   [x]Refill    []New medication request  []Follow up on prior request    Pharmacy:[facility]  Best contact for patient:    Additional notes: patient called checking status on Lipitor that was requested 1/31/25 please call pt once sent.   When pt was working with OT, pt began to feel lightheaded. OT did not continue with ambulation but stated they were cleared on the OT perspective. After hearing this, this nurse let the MD know and preformed orthostatics on the pt. Pt was negative for orthostatics but had elevated BP's. MD is aware.

## 2025-02-16 ENCOUNTER — HOSPITAL ENCOUNTER (OUTPATIENT)
Facility: HOSPITAL | Age: 70
Discharge: HOME | End: 2025-02-19
Payer: MEDICARE

## 2025-02-16 DIAGNOSIS — M25.562 LEFT KNEE PAIN, UNSPECIFIED CHRONICITY: ICD-10-CM

## 2025-02-16 PROCEDURE — 73562 X-RAY EXAM OF KNEE 3: CPT

## 2025-03-19 DIAGNOSIS — I10 ESSENTIAL HYPERTENSION: ICD-10-CM

## 2025-03-19 RX ORDER — LOSARTAN POTASSIUM 100 MG/1
100 TABLET ORAL DAILY
Qty: 90 TABLET | Refills: 1 | OUTPATIENT
Start: 2025-03-19

## 2025-03-19 NOTE — TELEPHONE ENCOUNTER
Requested Prescriptions     Pending Prescriptions Disp Refills    losartan (COZAAR) 100 MG tablet [Pharmacy Med Name: losartan 100 mg tablet] 90 tablet 1     Sig: TAKE 1 TABLET BY MOUTH DAILY       LOV 1/14/25  Last labs 1/14/25  Last refill 1/14/25 #90 with 1 RF    Per chart review patient prescribed Losartan-Hydrochlorothiazide on 1/14/25.  Spoke to Obdulia Marquez at [facility] and the losartan prescription was automatically generated.  States disregard.  Acknowledged understanding.

## 2025-07-25 ENCOUNTER — OFFICE VISIT (OUTPATIENT)
Dept: FAMILY MEDICINE CLINIC | Age: 70
End: 2025-07-25
Payer: MEDICARE

## 2025-07-25 VITALS
RESPIRATION RATE: 16 BRPM | HEART RATE: 75 BPM | WEIGHT: 210 LBS | DIASTOLIC BLOOD PRESSURE: 67 MMHG | HEIGHT: 70 IN | SYSTOLIC BLOOD PRESSURE: 146 MMHG | BODY MASS INDEX: 30.06 KG/M2 | OXYGEN SATURATION: 97 % | TEMPERATURE: 97.3 F

## 2025-07-25 DIAGNOSIS — S20.211A CHEST WALL HEMATOMA, RIGHT, INITIAL ENCOUNTER: Primary | ICD-10-CM

## 2025-07-25 PROCEDURE — 99213 OFFICE O/P EST LOW 20 MIN: CPT | Performed by: FAMILY MEDICINE

## 2025-07-25 PROCEDURE — 3077F SYST BP >= 140 MM HG: CPT | Performed by: FAMILY MEDICINE

## 2025-07-25 PROCEDURE — 1123F ACP DISCUSS/DSCN MKR DOCD: CPT | Performed by: FAMILY MEDICINE

## 2025-07-25 PROCEDURE — 1159F MED LIST DOCD IN RCRD: CPT | Performed by: FAMILY MEDICINE

## 2025-07-25 PROCEDURE — 3078F DIAST BP <80 MM HG: CPT | Performed by: FAMILY MEDICINE

## 2025-07-25 PROCEDURE — 1126F AMNT PAIN NOTED NONE PRSNT: CPT | Performed by: FAMILY MEDICINE

## 2025-07-25 ASSESSMENT — PATIENT HEALTH QUESTIONNAIRE - PHQ9
2. FEELING DOWN, DEPRESSED OR HOPELESS: NOT AT ALL
SUM OF ALL RESPONSES TO PHQ QUESTIONS 1-9: 0
1. LITTLE INTEREST OR PLEASURE IN DOING THINGS: NOT AT ALL
SUM OF ALL RESPONSES TO PHQ QUESTIONS 1-9: 0
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
## 2025-08-29 ENCOUNTER — OFFICE VISIT (OUTPATIENT)
Dept: FAMILY MEDICINE CLINIC | Age: 70
End: 2025-08-29
Payer: MEDICARE

## 2025-08-29 VITALS
RESPIRATION RATE: 18 BRPM | WEIGHT: 205 LBS | OXYGEN SATURATION: 97 % | TEMPERATURE: 97 F | DIASTOLIC BLOOD PRESSURE: 75 MMHG | HEIGHT: 70 IN | HEART RATE: 70 BPM | SYSTOLIC BLOOD PRESSURE: 135 MMHG | BODY MASS INDEX: 29.35 KG/M2

## 2025-08-29 DIAGNOSIS — I10 ESSENTIAL HYPERTENSION: ICD-10-CM

## 2025-08-29 DIAGNOSIS — E78.00 HYPERCHOLESTEROLEMIA: ICD-10-CM

## 2025-08-29 DIAGNOSIS — Z00.00 MEDICARE ANNUAL WELLNESS VISIT, SUBSEQUENT: Primary | ICD-10-CM

## 2025-08-29 PROCEDURE — 1123F ACP DISCUSS/DSCN MKR DOCD: CPT | Performed by: FAMILY MEDICINE

## 2025-08-29 PROCEDURE — 1126F AMNT PAIN NOTED NONE PRSNT: CPT | Performed by: FAMILY MEDICINE

## 2025-08-29 PROCEDURE — 99213 OFFICE O/P EST LOW 20 MIN: CPT | Performed by: FAMILY MEDICINE

## 2025-08-29 PROCEDURE — G0439 PPPS, SUBSEQ VISIT: HCPCS | Performed by: FAMILY MEDICINE

## 2025-08-29 PROCEDURE — 3075F SYST BP GE 130 - 139MM HG: CPT | Performed by: FAMILY MEDICINE

## 2025-08-29 PROCEDURE — 3078F DIAST BP <80 MM HG: CPT | Performed by: FAMILY MEDICINE

## 2025-08-29 ASSESSMENT — PATIENT HEALTH QUESTIONNAIRE - PHQ9
3. TROUBLE FALLING OR STAYING ASLEEP: NOT AT ALL
4. FEELING TIRED OR HAVING LITTLE ENERGY: NOT AT ALL
5. POOR APPETITE OR OVEREATING: NOT AT ALL
SUM OF ALL RESPONSES TO PHQ QUESTIONS 1-9: 0
SUM OF ALL RESPONSES TO PHQ QUESTIONS 1-9: 0
7. TROUBLE CONCENTRATING ON THINGS, SUCH AS READING THE NEWSPAPER OR WATCHING TELEVISION: NOT AT ALL
SUM OF ALL RESPONSES TO PHQ QUESTIONS 1-9: 0
9. THOUGHTS THAT YOU WOULD BE BETTER OFF DEAD, OR OF HURTING YOURSELF: NOT AT ALL
SUM OF ALL RESPONSES TO PHQ QUESTIONS 1-9: 0
6. FEELING BAD ABOUT YOURSELF - OR THAT YOU ARE A FAILURE OR HAVE LET YOURSELF OR YOUR FAMILY DOWN: NOT AT ALL
2. FEELING DOWN, DEPRESSED OR HOPELESS: NOT AT ALL
1. LITTLE INTEREST OR PLEASURE IN DOING THINGS: NOT AT ALL
8. MOVING OR SPEAKING SO SLOWLY THAT OTHER PEOPLE COULD HAVE NOTICED. OR THE OPPOSITE, BEING SO FIGETY OR RESTLESS THAT YOU HAVE BEEN MOVING AROUND A LOT MORE THAN USUAL: NOT AT ALL

## 2025-08-29 ASSESSMENT — LIFESTYLE VARIABLES
HOW OFTEN DURING THE LAST YEAR HAVE YOU FAILED TO DO WHAT WAS NORMALLY EXPECTED FROM YOU BECAUSE OF DRINKING: NEVER
HOW MANY STANDARD DRINKS CONTAINING ALCOHOL DO YOU HAVE ON A TYPICAL DAY: 3 OR 4
HOW OFTEN DURING THE LAST YEAR HAVE YOU NEEDED AN ALCOHOLIC DRINK FIRST THING IN THE MORNING TO GET YOURSELF GOING AFTER A NIGHT OF HEAVY DRINKING: NEVER
HOW OFTEN DURING THE LAST YEAR HAVE YOU FOUND THAT YOU WERE NOT ABLE TO STOP DRINKING ONCE YOU HAD STARTED: NEVER
HAVE YOU OR SOMEONE ELSE BEEN INJURED AS A RESULT OF YOUR DRINKING: NO
HOW OFTEN DURING THE LAST YEAR HAVE YOU BEEN UNABLE TO REMEMBER WHAT HAPPENED THE NIGHT BEFORE BECAUSE YOU HAD BEEN DRINKING: NEVER
HOW OFTEN DO YOU HAVE A DRINK CONTAINING ALCOHOL: 4 OR MORE TIMES A WEEK
HOW OFTEN DURING THE LAST YEAR HAVE YOU HAD A FEELING OF GUILT OR REMORSE AFTER DRINKING: NEVER
HAS A RELATIVE, FRIEND, DOCTOR, OR ANOTHER HEALTH PROFESSIONAL EXPRESSED CONCERN ABOUT YOUR DRINKING OR SUGGESTED YOU CUT DOWN: NO

## (undated) DEVICE — PADDING CAST W6INXL4YD RAYON UNDERCAST SOF-ROL

## (undated) DEVICE — GOWN,SIRUS,POLYRNF,RAGLAN,XL,ST,30/CS: Brand: MEDLINE

## (undated) DEVICE — YANKAUER,TAPERED BULBOUS TIP,W/O VENT: Brand: MEDLINE

## (undated) DEVICE — ZIMMER® STERILE DISPOSABLE TOURNIQUET CUFF WITH PROTECTIVE SLEEVE AND PLC, DUAL PORT, SINGLE BLADDER, 34 IN. (86 CM)

## (undated) DEVICE — 3M™ IOBAN™ 2 ANTIMICROBIAL INCISE DRAPE 6651EZ: Brand: IOBAN™ 2

## (undated) DEVICE — TOWEL,OR,DSP,ST,BLUE,STD,4/PK,20PK/CS: Brand: MEDLINE

## (undated) DEVICE — BLADE CLIPPER SURG SENSICLIP

## (undated) DEVICE — SYRINGE 20ML LL S/C 50

## (undated) DEVICE — DRAPE,U/ SHT,SPLIT,PLAS,STERIL: Brand: MEDLINE

## (undated) DEVICE — SOLUTION IV 250ML 0.9% SOD CHL PH 5 INJ USP VIAFLX PLAS

## (undated) DEVICE — ELECTRODE PT RET AD L9FT HI MOIST COND ADH HYDRGEL CORDED

## (undated) DEVICE — DRAPE,EXTREMITY,89X128,STERILE: Brand: MEDLINE

## (undated) DEVICE — SYR 20ML LL STRL LF --

## (undated) DEVICE — TUBE SUCT L10IN MINI FLTR CRV KAMVAC

## (undated) DEVICE — BOWL BNE CEM MIX SPAT CURET SMARTMIX CTS

## (undated) DEVICE — SUTURE VCRL 0 L27IN ABSRB VLT CT L40MM 1/2 CIR TAPERPOINT J352H

## (undated) DEVICE — BANDAGE,GAUZE,BULKEE II,4.5"X4.1YD,STRL: Brand: MEDLINE

## (undated) DEVICE — SOLUTION IRRIG 1000ML STRL H2O USP PLAS POUR BTL

## (undated) DEVICE — GLOVE ORANGE PI 8 1/2   MSG9085

## (undated) DEVICE — SHEET,DRAPE,53X77,STERILE: Brand: MEDLINE

## (undated) DEVICE — DRESSING HYDROFIBER AQUACEL AG ADVANTAGE 3.5X10 IN

## (undated) DEVICE — SUTURE ABSORBABLE BRAIDED 2-0 CT-1 27 IN UD VICRYL J259H

## (undated) DEVICE — HOOD, PEEL-AWAY: Brand: FLYTE

## (undated) DEVICE — LEGGINGS, PAIR, 33X51 XL, STERILE: Brand: MEDLINE

## (undated) DEVICE — 4-PORT MANIFOLD: Brand: NEPTUNE 2

## (undated) DEVICE — STAPLER SKIN H3.9MM WIRE DIA0.58MM CRWN 6.9MM 35 STPL ROT

## (undated) DEVICE — STOCKINETTE,IMPERVIOUS,12X48,STERILE: Brand: MEDLINE

## (undated) DEVICE — COUNTER NDL 30 COUNT DBL MAG

## (undated) DEVICE — TUBING, SUCTION, 1/4" X 10', STRAIGHT: Brand: MEDLINE

## (undated) DEVICE — HANDPIECE SET WITH HIGH FLOW TIP AND SUCTION TUBE: Brand: INTERPULSE

## (undated) DEVICE — SOLUTION IRRIG 1000ML 0.9% SOD CHL USP POUR PLAS BTL

## (undated) DEVICE — WRAP COMPR KNEE PREM W GEL BG

## (undated) DEVICE — APPLICATOR MEDICATED 26 CC SOLUTION HI LT ORNG CHLORAPREP

## (undated) DEVICE — BLANKET WRM W29.9XL79.1IN UP BODY FORC AIR MISTRAL-AIR

## (undated) DEVICE — MARKER SURG SKIN GENTIAN VLT REG TIP W/ 6IN RUL DYNJSM01

## (undated) DEVICE — PENCIL SMK EVAC ALL IN 1 DSGN ENH VISIBILITY IMPROVED AIR

## (undated) DEVICE — PACK,SET UP,NO DRAPES: Brand: MEDLINE

## (undated) DEVICE — NEEDLE HYPO 21GA L1.5IN GRN POLYPR HUB S STL REG BVL STR

## (undated) DEVICE — SYRINGE MED 30ML STD CLR PLAS LUERLOCK TIP N CTRL DISP

## (undated) DEVICE — SPONGE LAPAROTOMY W18XL18IN WHITE STRUNG RADIOPAQUE STERILE

## (undated) DEVICE — TRAP POLYP 1 CHMBR WIDE EYE

## (undated) DEVICE — CLEANER ES TIP W2XL2IN ADH BK RADPQ FOR S STL ELECTRD

## (undated) DEVICE — SHEET,DRAPE,70X85,STERILE: Brand: MEDLINE

## (undated) DEVICE — BIPOLAR SEALER 23-112-1 AQM 6.0: Brand: AQUAMANTYS ®

## (undated) DEVICE — BLADE SAW ACRION 19MM X 1.27MM X 90MM

## (undated) DEVICE — ENDO CARRY-ON PROCEDURE KIT INCLUDES ENZYMATIC SPONGE, GAUZE, BIOHAZARD LABEL, TRAY, LUBRICANT, DIRTY SCOPE LABEL, WATER LABEL, TRAY, DRAWSTRING PAD, AND DEFENDO 4-PIECE KIT.: Brand: ENDO CARRY-ON PROCEDURE KIT

## (undated) DEVICE — REM POLYHESIVE ADULT PATIENT RETURN ELECTRODE: Brand: VALLEYLAB

## (undated) DEVICE — SNARE ENDOSCP L240CM SHTH DIA2.4MM LOOP W20MM MIN WRK CHN

## (undated) DEVICE — COVER,TABLE,44X76,STERILE: Brand: MEDLINE

## (undated) DEVICE — FORCEPS ENDOSCP BX L230CM DIA2.8MM ALGTR CUP SPEC RETRV GI

## (undated) DEVICE — PAD,ABDOMINAL,8"X10",ST,LF: Brand: MEDLINE

## (undated) DEVICE — CLEAN UP KIT: Brand: MEDLINE INDUSTRIES, INC.

## (undated) DEVICE — INTENDED FOR TISSUE SEPARATION, AND OTHER PROCEDURES THAT REQUIRE A SHARP SURGICAL BLADE TO PUNCTURE OR CUT.: Brand: BARD-PARKER SAFETY BLADES SIZE 10, STERILE

## (undated) DEVICE — BANDAGE COBAN 6 IN WND 6INX5YD FOAM

## (undated) DEVICE — SOLUTION IRRIG 3000ML 0.9% SOD CHL ARTHROMATIC PLAS CONT

## (undated) DEVICE — BLUNT CANNULA: Brand: MONOJECT